# Patient Record
Sex: FEMALE | Race: OTHER | Employment: OTHER | URBAN - METROPOLITAN AREA
[De-identification: names, ages, dates, MRNs, and addresses within clinical notes are randomized per-mention and may not be internally consistent; named-entity substitution may affect disease eponyms.]

---

## 2022-01-17 ENCOUNTER — TELEPHONE (OUTPATIENT)
Dept: INTERNAL MEDICINE CLINIC | Age: 77
End: 2022-01-17

## 2022-01-17 NOTE — TELEPHONE ENCOUNTER
[facility] is calling in to state the patient's  is a  [facility] patient and would like to know if the doctor would accept his wife as a new patient.

## 2022-02-02 ENCOUNTER — OFFICE VISIT (OUTPATIENT)
Dept: INTERNAL MEDICINE CLINIC | Age: 77
End: 2022-02-02
Payer: MEDICARE

## 2022-02-02 VITALS
WEIGHT: 169.2 LBS | DIASTOLIC BLOOD PRESSURE: 79 MMHG | RESPIRATION RATE: 14 BRPM | BODY MASS INDEX: 31.95 KG/M2 | HEART RATE: 84 BPM | TEMPERATURE: 98.1 F | OXYGEN SATURATION: 97 % | SYSTOLIC BLOOD PRESSURE: 124 MMHG | HEIGHT: 61 IN

## 2022-02-02 DIAGNOSIS — E03.9 ACQUIRED HYPOTHYROIDISM: ICD-10-CM

## 2022-02-02 DIAGNOSIS — M41.9 SCOLIOSIS OF LUMBAR SPINE, UNSPECIFIED SCOLIOSIS TYPE: ICD-10-CM

## 2022-02-02 DIAGNOSIS — M43.17 SPONDYLOLISTHESIS OF LUMBOSACRAL REGION: ICD-10-CM

## 2022-02-02 DIAGNOSIS — M54.16 LUMBAR RADICULOPATHY, RIGHT: Primary | ICD-10-CM

## 2022-02-02 DIAGNOSIS — I10 BENIGN ESSENTIAL HTN: ICD-10-CM

## 2022-02-02 PROBLEM — E55.9 VITAMIN D DEFICIENCY: Status: ACTIVE | Noted: 2022-02-02

## 2022-02-02 PROBLEM — F11.99 OPIOID USE, UNSPECIFIED WITH UNSPECIFIED OPIOID-INDUCED DISORDER (HCC): Status: ACTIVE | Noted: 2022-02-02

## 2022-02-02 PROCEDURE — G8754 DIAS BP LESS 90: HCPCS | Performed by: INTERNAL MEDICINE

## 2022-02-02 PROCEDURE — G8510 SCR DEP NEG, NO PLAN REQD: HCPCS | Performed by: INTERNAL MEDICINE

## 2022-02-02 PROCEDURE — 3017F COLORECTAL CA SCREEN DOC REV: CPT | Performed by: INTERNAL MEDICINE

## 2022-02-02 PROCEDURE — G8536 NO DOC ELDER MAL SCRN: HCPCS | Performed by: INTERNAL MEDICINE

## 2022-02-02 PROCEDURE — G8400 PT W/DXA NO RESULTS DOC: HCPCS | Performed by: INTERNAL MEDICINE

## 2022-02-02 PROCEDURE — G8427 DOCREV CUR MEDS BY ELIG CLIN: HCPCS | Performed by: INTERNAL MEDICINE

## 2022-02-02 PROCEDURE — G8752 SYS BP LESS 140: HCPCS | Performed by: INTERNAL MEDICINE

## 2022-02-02 PROCEDURE — 99204 OFFICE O/P NEW MOD 45 MIN: CPT | Performed by: INTERNAL MEDICINE

## 2022-02-02 PROCEDURE — 1101F PT FALLS ASSESS-DOCD LE1/YR: CPT | Performed by: INTERNAL MEDICINE

## 2022-02-02 PROCEDURE — G8417 CALC BMI ABV UP PARAM F/U: HCPCS | Performed by: INTERNAL MEDICINE

## 2022-02-02 PROCEDURE — 1090F PRES/ABSN URINE INCON ASSESS: CPT | Performed by: INTERNAL MEDICINE

## 2022-02-02 RX ORDER — TRAMADOL HYDROCHLORIDE 50 MG/1
50 TABLET ORAL
COMMUNITY
Start: 2022-01-14 | End: 2022-02-02

## 2022-02-02 RX ORDER — LIOTHYRONINE SODIUM 5 UG/1
5 TABLET ORAL DAILY
COMMUNITY
Start: 2021-12-17

## 2022-02-02 RX ORDER — METHYLPREDNISOLONE 4 MG/1
TABLET ORAL
Qty: 1 DOSE PACK | Refills: 0 | Status: SHIPPED | OUTPATIENT
Start: 2022-02-02 | End: 2022-02-12

## 2022-02-02 RX ORDER — TRAMADOL HYDROCHLORIDE 50 MG/1
100 TABLET ORAL
Qty: 180 TABLET | Refills: 0 | Status: SHIPPED | OUTPATIENT
Start: 2022-02-02 | End: 2022-03-11

## 2022-02-02 RX ORDER — LEVOTHYROXINE SODIUM 75 UG/1
75 TABLET ORAL DAILY
COMMUNITY
Start: 2021-12-08 | End: 2022-09-30 | Stop reason: SDUPTHER

## 2022-02-02 RX ORDER — LISINOPRIL 5 MG/1
5 TABLET ORAL DAILY
COMMUNITY
Start: 2021-11-08 | End: 2022-02-28 | Stop reason: ALTCHOICE

## 2022-02-02 RX ORDER — GABAPENTIN 300 MG/1
300 CAPSULE ORAL 3 TIMES DAILY
COMMUNITY
Start: 2021-12-13 | End: 2022-03-07 | Stop reason: SDUPTHER

## 2022-02-02 NOTE — PROGRESS NOTES
HISTORY OF PRESENT ILLNESS    New patient to my practice, referred to me by her , Chelsea Asencio (my pt) and Dr. Chris Graves. Prior medical care has been from Dr. Kenan Calero. she works as a .  her  past medical history was reviewed, discussed, and summarized in the list below. Her primary medical frustration has been chronic lower back pain. She has a history of severe lumbar scoliosis. She consulted neurosurgery Dr. Addison Hendrix in the past and was referred to [facility] neurosurgery. She was recommended to have a extensive surgeries involving rods in her spine and she did not really want to do that at the time. She was referred to physical therapy as well. She has been prescribed gabapentin and milligrams 3 times daily by her primary care doctor Dr. Kenan Calero as well as tramadol 50 mg 4 times daily with his chronic pain. Unfortunately, she has recently had an exacerbation of this pain with a new onset radicular pain down her right leg. Onset was 1 month ago during a yoga class. Travels from her lower back to her buttock to hip to toes. She has had difficulty walking and some difficulty doing much of anything because of this pain. She says that taking an extra tramadol does help some. She has made an appointment with orthopedics Dr. Mirain Roach but she wonders if she should also see a neurosurgeon. Last filled gabapentin #180 12/13/21  Last filled tramadol 50 mg #120 1/14/22    Hypertension  Hypertension ROS: taking medications as instructed, no medication side effects noted, no TIA's, no chest pain on exertion, no dyspnea on exertion, no swelling of ankles     reports that she has never smoked. She has never used smokeless tobacco.    reports no history of alcohol use. BP Readings from Last 2 Encounters:   02/02/22 124/79     Hypothyroidism. She is followed by endocrinology Dr. Gaby Irizarry.   Sounds like patient was previously taking [place] Thyroid or something similar but was recently transitioned to levothyroxine and liothyronine. She is currently taking levothyroxine 75 mcg daily and liothyronine 5 mcg daily. Labs done January 5, 2022 listed below. TSH was low. Liothyronine was decreased. I think. Denies any palpitations. She did have some mild tremor. -HEMOGLOBIN A1C   2022-01-05     GLYCOHEMOGLOBIN (A1C)% 5.7   0.0 - 6.9   -CBC   2022-01-05     WBC 5.9   4.5 - 11.0   RBC 5.00   4.00 - 5.20   HGB 14.3   12.0 - 16.0   HCT 44   33 - 51   MCV 89   80 - 100   MCH 29   26 - 34   MCHC 32   32 - 36      150 - 450   RDW-CV 13   11 - 15   -CMP   2022-01-05     GLUCOSE 124   60 - 100   BUN 14   7 - 25   CREATININE 0.67   0.56 - 1.34   eGFR AA (calc) 110   >60   eGFR Non-AA (calc) 91   >60   SODIUM 140   136 - 145   POTASSIUM 4.7   3.5 - 5.1   CHLORIDE 105   98 - 107   CO2 31   21 - 31   CALCIUM 9.6   8.6 - 10.3   TOTAL PROTEIN 7.0   6.0 - 7.8   ALBUMIN 4.4   3.5 - 5.7   GLOBULIN (CALC) 2.7   1.5 - 4.5   A/G RATIO 1.6   1.1 - 3.1   BILIRUBIN,TOTAL 0.5   0.3 - 1.3   ALK PHOS 57   34 - 104   AST(SGOT) 15   11 - 45   ALT(SGPT) 16   7 - 52   -FTI TSH T3   2022-01-05     T4, Total 11.44   5.50 - 12.50   T-UPTAKE 45   32 - 48   FTI (FREE THYROXINE INDEX) 13   6 - 13   T3 106   71 - 180   TSH < 0.1   0.50 - 5.00   -VIT B12   2022-01-05     VIT B12 845   180 - 914   -VIT D, 25-OH   2022-01-05     VIT D, 25-OH 52.88   30.00 - 100.00   -TPO   2022-01-05     TPO 0.3   0.1 - 36.8     Review of Systems   All other systems reviewed and are negative, except as noted in HPI      Past Medical and Surgical History  Past Medical History:   Diagnosis Date    Benign essential HTN     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 Alex Mojica, Dr. Nick Marking Hiatal hernia 2015    s/p repair Dr. Malorie Bonilla Hyponatremia     126 12/16/21. improved 1/5/22.  Hypothyroidism     Dr. Leonard Gonzalez Lumbar radiculopathy, right 12/2021    Dr. Colin Albrecht.   onset during yoga class 12/2021    Multiple thyroid nodules 2015    Small bilateral thyroid nodules  Scoliosis 2014    Dr. Kyle Moses at [employer] [facility]. 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 She takes supplements not listed  Current Outpatient Medications   Medication Sig    lisinopriL (PRINIVIL, ZESTRIL) 5 mg tablet Take 5 mg by mouth daily.  gabapentin (NEURONTIN) 300 mg capsule Take 300 mg by mouth three (3) times daily.  levothyroxine (SYNTHROID) 75 mcg tablet Take 75 mcg by mouth daily.  liothyronine (CYTOMEL) 5 mcg tablet Take 5 mcg by mouth daily.  methylPREDNISolone (MEDROL DOSEPACK) 4 mg tablet USE AS DIRECTED ON PACKAGE    traMADoL (ULTRAM) 50 mg tablet Take 2 Tablets by mouth every eight (8) hours as needed for Pain for up to 30 days. Max Daily Amount: 300 mg. Increased 2/2/22     No current facility-administered medications for this visit. reports that she has never smoked. She has never used smokeless tobacco. She reports that she does not drink alcohol and does not use drugs. family history includes Heart Attack in her father; Hypertension in her father and mother; Thyroid Disease in her father. Physical Exam   Nursing note and vitals reviewed. Blood pressure 124/79, pulse 84, temperature 98.1 °F (36.7 °C), temperature source Oral, resp. rate 14, height 5' 1\" (1.549 m), weight 169 lb 3.2 oz (76.7 kg), SpO2 97 %. Constitutional: oriented to person, place, and time. No distress. Eyes: Conjunctivae are normal.   HEENT:  No cervical lymphadenopathy. No thyroid nodules or goiter  Cardiovascular: Normal rate. Regular rhythm, no murmurs, rubs.    No edema  Pulmonary/Chest: Effort normal. clear to auscultation  Abdominal: soft, non-tender, non-distended  Musculoskeletal: Neurological: Alert and oriented to person, place. Cranial nerves grossly intact. Normal gait   Skin: No visible rash noted. Psychiatric: Normal mood and affect. Behavior is normal.     ASSESSMENT and PLAN  1. Lumbar radiculopathy, right  She has chronic significant scoliosis and likely foraminal stenosis. Acute pain which has been persisting for over 1 month and is relatively debilitating. Has never had an MRI of her lumbar spine I think interventions will be needed. This may be a surgical issue and she will consult with Dr. Madhuri Bright and she may need to consult with a scoliosis specialist or neurosurgeon. She has been told that injections are not possible because of her degree of curvature. Could potentially benefit from physical therapy. Could potentially benefit from a brace. Trial of Medrol. Increase tramadol.  reviewed. Continue gabapentin.  -     MRI LUMB SPINE WO CONT; Future  -     methylPREDNISolone (MEDROL DOSEPACK) 4 mg tablet; USE AS DIRECTED ON PACKAGE, Normal, Disp-1 Dose Pack, R-0  -     traMADoL (ULTRAM) 50 mg tablet; Take 2 Tablets by mouth every eight (8) hours as needed for Pain for up to 30 days. Max Daily Amount: 300 mg. Increased 2/2/22, Normal, Disp-180 Tablet, R-0  2. Spondylolisthesis of lumbosacral region  3. Scoliosis of lumbar spine, unspecified scoliosis type  -     MRI LUMB SPINE WO CONT; Future  -     methylPREDNISolone (MEDROL DOSEPACK) 4 mg tablet; USE AS DIRECTED ON PACKAGE, Normal, Disp-1 Dose Pack, R-0  -     traMADoL (ULTRAM) 50 mg tablet; Take 2 Tablets by mouth every eight (8) hours as needed for Pain for up to 30 days. Max Daily Amount: 300 mg. Increased 2/2/22, Normal, Disp-180 Tablet, R-0    4. Acquired hypothyroidism  Managed by endocrinology Dr. Julia Jansen. On levothyroxine and liothyronine. Currently largely asymptomatic    5.  Benign essential HTN  This condition is controlled on current medication regimen as written in medication list.  Medications refilled. She will send me records from Dr. Pillo Mcnally office.   Return to clinic for wellness exam      lab results and schedule of future lab studies reviewed with patient  reviewed medications and side effects in detail

## 2022-02-10 ENCOUNTER — HOSPITAL ENCOUNTER (OUTPATIENT)
Dept: MRI IMAGING | Age: 77
Discharge: HOME | End: 2022-02-10
Attending: INTERNAL MEDICINE
Payer: MEDICARE

## 2022-02-10 DIAGNOSIS — M54.16 LUMBAR RADICULOPATHY, RIGHT: ICD-10-CM

## 2022-02-10 DIAGNOSIS — M41.9 SCOLIOSIS OF LUMBAR SPINE, UNSPECIFIED SCOLIOSIS TYPE: ICD-10-CM

## 2022-02-10 DIAGNOSIS — M43.17 SPONDYLOLISTHESIS OF LUMBOSACRAL REGION: ICD-10-CM

## 2022-02-10 PROCEDURE — 72148 MRI LUMBAR SPINE W/O DYE: CPT

## 2022-02-28 RX ORDER — AMLODIPINE BESYLATE 5 MG/1
5 TABLET ORAL DAILY
Qty: 90 TABLET | Refills: 1 | Status: SHIPPED | OUTPATIENT
Start: 2022-02-28 | End: 2022-08-16

## 2022-03-07 ENCOUNTER — PATIENT MESSAGE (OUTPATIENT)
Dept: INTERNAL MEDICINE CLINIC | Age: 77
End: 2022-03-07

## 2022-03-07 DIAGNOSIS — M54.16 LUMBAR RADICULOPATHY, RIGHT: Primary | ICD-10-CM

## 2022-03-07 RX ORDER — GABAPENTIN 300 MG/1
300 CAPSULE ORAL 3 TIMES DAILY
Qty: 90 CAPSULE | Refills: 0 | Status: SHIPPED | OUTPATIENT
Start: 2022-03-07 | End: 2022-04-26

## 2022-03-11 DIAGNOSIS — M54.16 LUMBAR RADICULOPATHY, RIGHT: ICD-10-CM

## 2022-03-11 DIAGNOSIS — M41.9 SCOLIOSIS OF LUMBAR SPINE, UNSPECIFIED SCOLIOSIS TYPE: ICD-10-CM

## 2022-03-11 RX ORDER — TRAMADOL HYDROCHLORIDE 50 MG/1
TABLET ORAL
Qty: 180 TABLET | Refills: 0 | Status: SHIPPED | OUTPATIENT
Start: 2022-03-11 | End: 2022-04-26

## 2022-03-18 PROBLEM — E55.9 VITAMIN D DEFICIENCY: Status: ACTIVE | Noted: 2022-02-02

## 2022-03-19 PROBLEM — E03.9 HYPOTHYROIDISM: Status: ACTIVE | Noted: 2022-02-02

## 2022-03-20 PROBLEM — M54.16 LUMBAR RADICULOPATHY, RIGHT: Status: ACTIVE | Noted: 2021-12-01

## 2022-06-02 ENCOUNTER — TELEPHONE (OUTPATIENT)
Dept: INTERNAL MEDICINE CLINIC | Age: 77
End: 2022-06-02

## 2022-06-03 NOTE — TELEPHONE ENCOUNTER
She calls with luq abdominal pain that started today and an episode of vomiting when leaning over and describes urine drainage that is dark and thick like loose stool-no fevers no flank pain. The pain has increased over the last few hours.  I advised her to go to the er for evaluation to be sure no signs of pyelonephritis given symptoms-she agrees and plans to go to [facility] er

## 2022-06-09 ENCOUNTER — TELEPHONE (OUTPATIENT)
Dept: INTERNAL MEDICINE CLINIC | Age: 77
End: 2022-06-09

## 2022-06-09 NOTE — TELEPHONE ENCOUNTER
Spoke with patient and relayed Dr. Bhavana Porras comments and recommendations. She states understanding and scheduled a Hospital F/U 6/21/22 @ 11:20 AM.  Maxim Amos for the call.

## 2022-06-09 NOTE — TELEPHONE ENCOUNTER
I have communicated with her urologist, Dr. Vitaly Batista. She has kidney stones and recent bilateral stents. Bleeding is expected now and may last weeks. No intervention is needed unless she is passing significant clots or having issues with starting urination due to clots. Follow-up with urology. Drink plenty of water.

## 2022-06-09 NOTE — TELEPHONE ENCOUNTER
RTC to patient and she reports that last week she yosi tot  ER for Bilateral Kidney Pain. Patient found to have bilateral kidney stones and was admitted 22 and on 22 Dr. Laneta Apgar placed stents. She reports that she has had bright red urine since she left the hospital and was ambulatory. Advised that she should f/u with Dr. Laneta Apgar and she reports that his office told her to go to ER or f/u  With PCP. Advised that she should go to ER for evaluation and she is asking if she could just have and acute care visit with Dr. Deedee Álvarez. Dr. Deedee Álvarez notified.

## 2022-06-10 DIAGNOSIS — M41.9 SCOLIOSIS OF LUMBAR SPINE, UNSPECIFIED SCOLIOSIS TYPE: ICD-10-CM

## 2022-06-10 DIAGNOSIS — M54.16 LUMBAR RADICULOPATHY, RIGHT: ICD-10-CM

## 2022-06-10 RX ORDER — TRAMADOL HYDROCHLORIDE 50 MG/1
TABLET ORAL
Qty: 180 TABLET | Refills: 0 | Status: SHIPPED | OUTPATIENT
Start: 2022-06-10 | End: 2022-07-17

## 2022-06-21 ENCOUNTER — OFFICE VISIT (OUTPATIENT)
Dept: INTERNAL MEDICINE CLINIC | Age: 77
End: 2022-06-21
Payer: MEDICARE

## 2022-06-21 VITALS
HEART RATE: 89 BPM | OXYGEN SATURATION: 96 % | DIASTOLIC BLOOD PRESSURE: 76 MMHG | HEIGHT: 61 IN | WEIGHT: 169.8 LBS | TEMPERATURE: 98.6 F | BODY MASS INDEX: 32.06 KG/M2 | SYSTOLIC BLOOD PRESSURE: 125 MMHG | RESPIRATION RATE: 15 BRPM

## 2022-06-21 DIAGNOSIS — N20.0 NEPHROLITHIASIS: Primary | ICD-10-CM

## 2022-06-21 DIAGNOSIS — R94.31 EKG ABNORMALITIES: ICD-10-CM

## 2022-06-21 DIAGNOSIS — R31.0 GROSS HEMATURIA: ICD-10-CM

## 2022-06-21 DIAGNOSIS — I10 BENIGN ESSENTIAL HTN: ICD-10-CM

## 2022-06-21 DIAGNOSIS — R74.8 SERUM LIPASE ELEVATION: ICD-10-CM

## 2022-06-21 DIAGNOSIS — K86.9 PANCREATIC LESION: ICD-10-CM

## 2022-06-21 DIAGNOSIS — D43.2 HEMANGIOBLASTOMA OF BRAIN (HCC): ICD-10-CM

## 2022-06-21 PROCEDURE — G8427 DOCREV CUR MEDS BY ELIG CLIN: HCPCS | Performed by: INTERNAL MEDICINE

## 2022-06-21 PROCEDURE — G8400 PT W/DXA NO RESULTS DOC: HCPCS | Performed by: INTERNAL MEDICINE

## 2022-06-21 PROCEDURE — 1101F PT FALLS ASSESS-DOCD LE1/YR: CPT | Performed by: INTERNAL MEDICINE

## 2022-06-21 PROCEDURE — G8752 SYS BP LESS 140: HCPCS | Performed by: INTERNAL MEDICINE

## 2022-06-21 PROCEDURE — G8536 NO DOC ELDER MAL SCRN: HCPCS | Performed by: INTERNAL MEDICINE

## 2022-06-21 PROCEDURE — 99214 OFFICE O/P EST MOD 30 MIN: CPT | Performed by: INTERNAL MEDICINE

## 2022-06-21 PROCEDURE — 3017F COLORECTAL CA SCREEN DOC REV: CPT | Performed by: INTERNAL MEDICINE

## 2022-06-21 PROCEDURE — G8417 CALC BMI ABV UP PARAM F/U: HCPCS | Performed by: INTERNAL MEDICINE

## 2022-06-21 PROCEDURE — 1090F PRES/ABSN URINE INCON ASSESS: CPT | Performed by: INTERNAL MEDICINE

## 2022-06-21 PROCEDURE — G8510 SCR DEP NEG, NO PLAN REQD: HCPCS | Performed by: INTERNAL MEDICINE

## 2022-06-21 PROCEDURE — G8754 DIAS BP LESS 90: HCPCS | Performed by: INTERNAL MEDICINE

## 2022-06-21 RX ORDER — ACETYLCYSTEINE 600 MG
CAPSULE ORAL DAILY
COMMUNITY

## 2022-06-21 RX ORDER — MV-MN/C/THEANINE/HERB NO.310 1000-200MG
POWDER IN PACKET (EA) ORAL DAILY
COMMUNITY

## 2022-06-21 RX ORDER — MELATONIN 5 MG
CAPSULE ORAL
COMMUNITY

## 2022-06-21 RX ORDER — CHOLECALCIFEROL (VITAMIN D3) 125 MCG
100 CAPSULE ORAL DAILY
COMMUNITY
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
## 2022-07-09 ENCOUNTER — APPOINTMENT (OUTPATIENT)
Dept: CT IMAGING | Age: 77
End: 2022-07-09
Attending: STUDENT IN AN ORGANIZED HEALTH CARE EDUCATION/TRAINING PROGRAM
Payer: MEDICARE

## 2022-07-09 ENCOUNTER — HOSPITAL ENCOUNTER (EMERGENCY)
Age: 77
Discharge: HOME | End: 2022-07-09
Attending: STUDENT IN AN ORGANIZED HEALTH CARE EDUCATION/TRAINING PROGRAM
Payer: MEDICARE

## 2022-07-09 VITALS
OXYGEN SATURATION: 97 % | SYSTOLIC BLOOD PRESSURE: 131 MMHG | DIASTOLIC BLOOD PRESSURE: 56 MMHG | HEIGHT: 61 IN | HEART RATE: 66 BPM | RESPIRATION RATE: 16 BRPM | WEIGHT: 165 LBS | BODY MASS INDEX: 31.15 KG/M2 | TEMPERATURE: 99.8 F

## 2022-07-09 DIAGNOSIS — N12 PYELONEPHRITIS: Primary | ICD-10-CM

## 2022-07-09 DIAGNOSIS — N13.30 HYDRONEPHROSIS, UNSPECIFIED HYDRONEPHROSIS TYPE: ICD-10-CM

## 2022-07-09 LAB
ALBUMIN SERPL-MCNC: 3.1 G/DL (ref 3.5–5)
ALBUMIN/GLOB SERPL: 0.7 {RATIO} (ref 1.1–2.2)
ALP SERPL-CCNC: 74 U/L (ref 45–117)
ALT SERPL-CCNC: 21 U/L (ref 12–78)
ANION GAP SERPL CALC-SCNC: 5 MMOL/L (ref 5–15)
APPEARANCE UR: ABNORMAL
AST SERPL-CCNC: 16 U/L (ref 15–37)
BACTERIA URNS QL MICRO: ABNORMAL /HPF
BASOPHILS # BLD: 0 K/UL (ref 0–0.1)
BASOPHILS NFR BLD: 0 % (ref 0–1)
BILIRUB SERPL-MCNC: 0.4 MG/DL (ref 0.2–1)
BILIRUB UR QL: NEGATIVE
BUN SERPL-MCNC: 16 MG/DL (ref 6–20)
BUN/CREAT SERPL: 24 (ref 12–20)
CALCIUM SERPL-MCNC: 8.9 MG/DL (ref 8.5–10.1)
CHLORIDE SERPL-SCNC: 105 MMOL/L (ref 97–108)
CO2 SERPL-SCNC: 27 MMOL/L (ref 21–32)
COLOR UR: ABNORMAL
COMMENT, HOLDF: NORMAL
CREAT SERPL-MCNC: 0.66 MG/DL (ref 0.55–1.02)
DIFFERENTIAL METHOD BLD: ABNORMAL
EOSINOPHIL # BLD: 0 K/UL (ref 0–0.4)
EOSINOPHIL NFR BLD: 0 % (ref 0–7)
EPITH CASTS URNS QL MICRO: ABNORMAL /LPF
ERYTHROCYTE [DISTWIDTH] IN BLOOD BY AUTOMATED COUNT: 13.6 % (ref 11.5–14.5)
GLOBULIN SER CALC-MCNC: 4.2 G/DL (ref 2–4)
GLUCOSE SERPL-MCNC: 129 MG/DL (ref 65–100)
GLUCOSE UR STRIP.AUTO-MCNC: NEGATIVE MG/DL
HCT VFR BLD AUTO: 37.2 % (ref 35–47)
HGB BLD-MCNC: 12.2 G/DL (ref 11.5–16)
HGB UR QL STRIP: ABNORMAL
IMM GRANULOCYTES # BLD AUTO: 0 K/UL
IMM GRANULOCYTES NFR BLD AUTO: 0 %
KETONES UR QL STRIP.AUTO: ABNORMAL MG/DL
LACTATE BLD-SCNC: 0.42 MMOL/L (ref 0.4–2)
LEUKOCYTE ESTERASE UR QL STRIP.AUTO: ABNORMAL
LYMPHOCYTES # BLD: 0.6 K/UL (ref 0.8–3.5)
LYMPHOCYTES NFR BLD: 7 % (ref 12–49)
MCH RBC QN AUTO: 27.7 PG (ref 26–34)
MCHC RBC AUTO-ENTMCNC: 32.8 G/DL (ref 30–36.5)
MCV RBC AUTO: 84.5 FL (ref 80–99)
MONOCYTES # BLD: 0.4 K/UL (ref 0–1)
MONOCYTES NFR BLD: 5 % (ref 5–13)
NEUTS BAND NFR BLD MANUAL: 21 % (ref 0–6)
NEUTS SEG # BLD: 7.5 K/UL (ref 1.8–8)
NEUTS SEG NFR BLD: 67 % (ref 32–75)
NITRITE UR QL STRIP.AUTO: POSITIVE
NRBC # BLD: 0 K/UL (ref 0–0.01)
NRBC BLD-RTO: 0 PER 100 WBC
PH UR STRIP: 5.5 [PH] (ref 5–8)
PLATELET # BLD AUTO: 230 K/UL (ref 150–400)
PMV BLD AUTO: 9.6 FL (ref 8.9–12.9)
POTASSIUM SERPL-SCNC: 4.1 MMOL/L (ref 3.5–5.1)
PROT SERPL-MCNC: 7.3 G/DL (ref 6.4–8.2)
PROT UR STRIP-MCNC: 100 MG/DL
RBC # BLD AUTO: 4.4 M/UL (ref 3.8–5.2)
RBC #/AREA URNS HPF: ABNORMAL /HPF (ref 0–5)
RBC MORPH BLD: ABNORMAL
SAMPLES BEING HELD,HOLD: NORMAL
SODIUM SERPL-SCNC: 137 MMOL/L (ref 136–145)
SP GR UR REFRACTOMETRY: 1.01 (ref 1–1.03)
UROBILINOGEN UR QL STRIP.AUTO: 1 EU/DL (ref 0.2–1)
WBC # BLD AUTO: 8.5 K/UL (ref 3.6–11)
WBC URNS QL MICRO: ABNORMAL /HPF (ref 0–4)

## 2022-07-09 PROCEDURE — 36415 COLL VENOUS BLD VENIPUNCTURE: CPT

## 2022-07-09 PROCEDURE — 74176 CT ABD & PELVIS W/O CONTRAST: CPT

## 2022-07-09 PROCEDURE — 99284 EMERGENCY DEPT VISIT MOD MDM: CPT

## 2022-07-09 PROCEDURE — 87186 SC STD MICRODIL/AGAR DIL: CPT

## 2022-07-09 PROCEDURE — 87086 URINE CULTURE/COLONY COUNT: CPT

## 2022-07-09 PROCEDURE — 81001 URINALYSIS AUTO W/SCOPE: CPT

## 2022-07-09 PROCEDURE — 87077 CULTURE AEROBIC IDENTIFY: CPT

## 2022-07-09 PROCEDURE — 74011250636 HC RX REV CODE- 250/636: Performed by: STUDENT IN AN ORGANIZED HEALTH CARE EDUCATION/TRAINING PROGRAM

## 2022-07-09 PROCEDURE — 83605 ASSAY OF LACTIC ACID: CPT

## 2022-07-09 PROCEDURE — 96360 HYDRATION IV INFUSION INIT: CPT

## 2022-07-09 PROCEDURE — 80053 COMPREHEN METABOLIC PANEL: CPT

## 2022-07-09 PROCEDURE — 85025 COMPLETE CBC W/AUTO DIFF WBC: CPT

## 2022-07-09 RX ORDER — CEFDINIR 300 MG/1
300 CAPSULE ORAL 2 TIMES DAILY
Qty: 20 CAPSULE | Refills: 0 | Status: SHIPPED | OUTPATIENT
Start: 2022-07-09 | End: 2022-07-19

## 2022-07-09 RX ADMIN — SODIUM CHLORIDE 1000 ML: 9 INJECTION, SOLUTION INTRAVENOUS at 12:04

## 2022-07-09 NOTE — ED TRIAGE NOTES
Patient arrives ambulatory to ED with complaints of fever and right flank pain     Also reports nausea     History of kidney stones with stent placement, was seen by provider today, given rocephin injection    Tylenol taken at 0700

## 2022-07-09 NOTE — ED PROVIDER NOTES
Date of Service:  7/9/2022    Patient:  Althea Rico    Chief Complaint:  Flank Pain       HPI:  Althea Rico is a 78 y.o.  female with a past medical history of HTN and kidney stones who presents for evaluation of flank pain and fever. Patient was admitted 6/4-6/6 for bilateral nephrolithiasis requiring stent placement bilaterally, with stent extraction on 6/28. Patient reports several days ago right flank pain returned. She has since been having fevers for the last 2 nights. T-max this morning 101 Fahrenheit, took Tylenol at 7 AM.  Patient was seen in outpatient urology office just prior to arrival, sent to the emergency department for further work-up with concern for infected stone. Patient was given a dose of ceftriaxone in office prior to arrival.  She endorses associated nausea but no vomiting. No dysuria, has had hematuria for the last 1 month and this is not new for her. Well no other recent illnesses. 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 s/p surgery Dr. Natasha Galindo, Dr. Baum Heal hernia 2015    s/p repair Dr. Letitia Wright Hyponatremia     126 12/16/21. improved 1/5/22.  Hypothyroidism     Dr. Boby Potts Lumbar radiculopathy, right 12/2021    Dr. Jessica Walsh. onset during yoga class 12/2021    Multiple thyroid nodules 2015    Small bilateral thyroid nodules    Nephrolithiasis 06/06/2022    Dr. Ene Louise. stents placed    Scoliosis 2014    Dr. Catrachito Lam at [facility]. recommended to have surgery     Spondylolisthesis 9/19/2014    Tarlov cyst 4/1/2014    Vitamin D deficiency 2/2/2022       Past Surgical History:   Procedure Laterality Date    HX ABDOMINAL LAPAROSCOPY  2015    lap nissen repair of hiatal hernia. Dr. Mickey QUIROZ  2014    Hemangioblastoma brain surgery. Dr. Lionel Ugalde, Dr. Elise Vazquez 2011    Dr. Rian Miramontes  2002    [facility]  2014    Thyroid nodule exam    HX OTHER SURGICAL  06/10/2022    stent - kidney         Family History:   Problem Relation Age of Onset    Hypertension Mother     Hypertension Father     Heart Attack Father     Thyroid Disease Father         thyroid removed       Social History     Socioeconomic History    Marital status:      Spouse name: Prakash Rm Number of children: 3    Years of education: Not on file    Highest education level: Not on file   Occupational History    Not on file   Tobacco Use    Smoking status: Never Smoker    Smokeless tobacco: Never Used   Substance and Sexual Activity    Alcohol use: Never    Drug use: Never    Sexual activity: Yes     Partners: Male   Other Topics Concern    Not on file   Social History Narrative    Not on file     Social Determinants of Health     Financial Resource Strain:     Difficulty of Paying Living Expenses: Not on file   Food Insecurity:     Worried About Running Out of Food in the Last Year: Not on file    Pam of Food in the Last Year: Not on file   Transportation Needs:     Lack of Transportation (Medical): Not on file    Lack of Transportation (Non-Medical):  Not on file   Physical Activity:     Days of Exercise per Week: Not on file    Minutes of Exercise per Session: Not on file   Stress:     Feeling of Stress : Not on file   Social Connections:     Frequency of Communication with Friends and Family: Not on file    Frequency of Social Gatherings with Friends and Family: Not on file    Attends Jehovah's witness Services: Not on file    Active Member of Clubs or Organizations: Not on file    Attends Club or Organization Meetings: Not on file    Marital Status: Not on file   Intimate Partner Violence:     Fear of Current or Ex-Partner: Not on file   [employer] Abused: Not on file    Physically Abused: Not on file    Sexually Abused: Not on file   Housing Stability:     Unable to Pay for Housing in the Last Year: Not on file    Number of Places Lived in the Last Year: Not on file    Unstable Housing in the Last Year: Not on file         ALLERGIES: Patient has no known allergies. Review of Systems   Constitutional: Positive for fever. Negative for chills. HENT: Negative for congestion and rhinorrhea. Eyes: Negative for discharge and redness. Respiratory: Negative for cough and shortness of breath. Cardiovascular: Negative for chest pain and leg swelling. Gastrointestinal: Positive for nausea. Negative for abdominal pain, diarrhea and vomiting. Genitourinary: Positive for flank pain and hematuria. Negative for dysuria. Musculoskeletal: Negative for back pain and myalgias. Skin: Negative for color change and rash. Neurological: Negative for speech difficulty and headaches. Psychiatric/Behavioral: Negative for agitation and confusion. Vitals:    07/09/22 1123   BP: 135/87   Pulse: (!) 104   Resp: 17   Temp: 99.4 °F (37.4 °C)   SpO2: 94%   Weight: 74.8 kg (165 lb)   Height: 5' 1\" (1.549 m)            Physical Exam  Vitals and nursing note reviewed. Constitutional:       Appearance: Normal appearance. HENT:      Head: Normocephalic and atraumatic. Mouth/Throat:      Mouth: Mucous membranes are moist.      Pharynx: Oropharynx is clear. Eyes:      Extraocular Movements: Extraocular movements intact. Conjunctiva/sclera: Conjunctivae normal.   Cardiovascular:      Rate and Rhythm: Regular rhythm. Tachycardia present. Pulses: Normal pulses. Pulmonary:      Effort: Pulmonary effort is normal. No respiratory distress. Breath sounds: Normal breath sounds. Abdominal:      General: Abdomen is flat. Palpations: Abdomen is soft. Tenderness: There is no abdominal tenderness.  There is no right CVA tenderness or left CVA tenderness. Musculoskeletal:         General: Normal range of motion. Right lower leg: No edema. Left lower leg: No edema. Skin:     General: Skin is warm and dry. Capillary Refill: Capillary refill takes less than 2 seconds. Neurological:      General: No focal deficit present. Mental Status: She is alert and oriented to person, place, and time. Psychiatric:         Mood and Affect: Mood normal.         Behavior: Behavior normal.          MDM  Number of Diagnoses or Management Options  Hydronephrosis, unspecified hydronephrosis type  Pyelonephritis  Diagnosis management comments: DECISION MAKING:  Clayton Kennedy is a 78 y.o. female who comes in as above. On arrival to the emergency department, vital signs notable for mild tachycardia working limited to, the right stable. ThereShe is afebrile. Hospital does not meet SIRS criteria. Differential diagnosis includes, but not limited to, ureterolithiasis, infected ureterolithiasis, pyelonephritis, cystitis. She will be evaluated with labs, UA and CT imaging of the abdomen and pelvis to assess for stones. 1 L IV fluids for hydration. Labs notable for no leukocytosis but bandemia at 21%. Electrolytes and kidney function normal. CT shows non-obstructing b/l renal calculi with right hydronephrosis and hydroureter but no definitive stone. Imaging was reviewed by on-call urologist and believes this is likely residual from recent stent. UA does show infection and will be sent for culture. Given she was recently treated with a course of Keflex, will start course of cefdinir. Patient received ceftriaxone today during her outpatient urology appointment. Results and incidental CT findings were discussed with patient. She was instructed to start the cefdinir tonight.   She was encouraged to return immediately to the emergency department if her fevers are not improving despite antibiotics, if she develops worsening pain, vomiting and cannot keep her antibiotic down or any other concerns. She was instructed to follow-up with her urologist this upcoming week. Patient verbalized understanding and was discharged. IMAGING:  CT ABD PELV WO CONT   Final Result        1. There are bilateral nonobstructing renal calculi. There is moderate right     hydroureteronephrosis. Right ureter is dilated into the right pelvis and the     distal right ureter does not appear dilated. No definite stone is identified     within the ureter. 2 tiny calcifications in the right pelvis appear to be    outside of the ureter or adjacent to the ureter may be phleboliths. . CT IVP may    be helpful for further assessment to exclude ureteral abnormality. 2. There are low densities in the liver which are nonspecific and could be cysts    or hemangiomata. Medications During Visit:  Medications  sodium chloride 0.9 % bolus infusion 1,000 mL (0 mL IntraVENous Stopped 7/9/22 1300)      IMPRESSION:  Pyelonephritis  (primary encounter diagnosis)  Hydronephrosis, unspecified hydronephrosis type    DISPOSITION:  Discharged      Discharge Medication List as of 7/9/2022  1:18 PM    START taking these medications    cefdinir (OMNICEF) 300 mg capsule  Take 1 Capsule by mouth two (2) times a day for 10 days. , Normal, Disp-20 Capsule, R-0        Follow-up Information     Follow up With Specialties Details Why Contact Info    Tin Thorne MD Internal Medicine Physician Schedule an appointment as soon as possible for a visit      Your urologist  Schedule an appointment as soon as possible for a visit               The patient is asked to follow-up with their primary care provider in the next several days. They are to call tomorrow for an appointment. The patient is asked to return promptly for any increased concerns or worsening of symptoms. They can return to this emergency department or any other emergency department.          Amount and/or Complexity of Data Reviewed  Clinical lab tests: reviewed  Tests in the radiology section of CPT®: reviewed           Procedures        Geraldine Lorenz DO

## 2022-07-09 NOTE — DISCHARGE INSTRUCTIONS
Please follow-up with urologist on Monday. Please take your antibiotic as directed. Return to the emergency department if you are having continued fevers despite antibiotics, if you have worsening pain, if you are vomiting and cannot keep fluids down or any other concerns.

## 2022-07-12 LAB
BACTERIA SPEC CULT: ABNORMAL
CC UR VC: ABNORMAL
SERVICE CMNT-IMP: ABNORMAL

## 2022-07-13 RX ORDER — CIPROFLOXACIN 500 MG/1
500 TABLET ORAL 2 TIMES DAILY
Qty: 14 TABLET | Refills: 0 | Status: SHIPPED | OUTPATIENT
Start: 2022-07-13 | End: 2022-07-20

## 2022-07-13 NOTE — PROGRESS NOTES
Pt returned call. Discussed culture result.   Informed to stop omnicef  ERX for cipro 500 mg bid x 7 days to [facility] on [address]

## 2022-08-01 ENCOUNTER — HOSPITAL ENCOUNTER (OUTPATIENT)
Dept: MRI IMAGING | Age: 77
Discharge: HOME | End: 2022-08-01
Attending: INTERNAL MEDICINE
Payer: MEDICARE

## 2022-08-01 VITALS — WEIGHT: 165 LBS | BODY MASS INDEX: 31.18 KG/M2

## 2022-08-01 DIAGNOSIS — R74.8 SERUM LIPASE ELEVATION: ICD-10-CM

## 2022-08-01 DIAGNOSIS — K86.9 PANCREATIC LESION: ICD-10-CM

## 2022-08-01 PROCEDURE — A9576 INJ PROHANCE MULTIPACK: HCPCS | Performed by: RADIOLOGY

## 2022-08-01 PROCEDURE — 74011250636 HC RX REV CODE- 250/636: Performed by: RADIOLOGY

## 2022-08-01 PROCEDURE — 77030021566

## 2022-08-01 PROCEDURE — 74183 MRI ABD W/O CNTR FLWD CNTR: CPT

## 2022-08-01 RX ADMIN — GADOTERIDOL 14 ML: 279.3 INJECTION, SOLUTION INTRAVENOUS at 15:29

## 2022-10-05 DIAGNOSIS — M54.16 LUMBAR RADICULOPATHY, RIGHT: ICD-10-CM

## 2022-10-05 RX ORDER — TRAMADOL HYDROCHLORIDE 50 MG/1
100 TABLET ORAL
Qty: 180 TABLET | Refills: 0 | Status: SHIPPED | OUTPATIENT
Start: 2022-10-05 | End: 2022-11-04 | Stop reason: SDUPTHER

## 2023-01-04 RX ORDER — AMLODIPINE BESYLATE 5 MG/1
TABLET ORAL
Qty: 90 TABLET | Refills: 1 | Status: SHIPPED | OUTPATIENT
Start: 2023-01-04

## 2023-01-09 DIAGNOSIS — M54.16 LUMBAR RADICULOPATHY, RIGHT: ICD-10-CM

## 2023-01-09 RX ORDER — TRAMADOL HYDROCHLORIDE 50 MG/1
100 TABLET ORAL
Qty: 180 TABLET | Refills: 1 | Status: SHIPPED | OUTPATIENT
Start: 2023-01-09 | End: 2023-04-09

## 2023-03-10 DIAGNOSIS — M54.16 LUMBAR RADICULOPATHY, RIGHT: ICD-10-CM

## 2023-03-11 RX ORDER — TRAMADOL HYDROCHLORIDE 50 MG/1
TABLET ORAL
Qty: 180 TABLET | Refills: 0 | Status: SHIPPED | OUTPATIENT
Start: 2023-03-11 | End: 2023-04-10

## 2023-05-19 RX ORDER — AMLODIPINE BESYLATE 5 MG/1
1 TABLET ORAL DAILY
COMMUNITY
Start: 2023-01-04

## 2023-05-19 RX ORDER — LIOTHYRONINE SODIUM 5 UG/1
5 TABLET ORAL DAILY
COMMUNITY
Start: 2021-12-17

## 2023-05-19 RX ORDER — LEVOTHYROXINE SODIUM 0.07 MG/1
75 TABLET ORAL DAILY
COMMUNITY
Start: 2022-09-30

## 2023-05-19 RX ORDER — UBIDECARENONE 100 MG
100 CAPSULE ORAL DAILY
COMMUNITY

## 2023-05-19 RX ORDER — GABAPENTIN 300 MG/1
CAPSULE ORAL
COMMUNITY
Start: 2023-02-11

## 2023-05-19 RX ORDER — TRAMADOL HYDROCHLORIDE 50 MG/1
TABLET ORAL
COMMUNITY
Start: 2023-05-02 | End: 2023-06-01

## 2023-08-13 RX ORDER — AMLODIPINE BESYLATE 5 MG/1
TABLET ORAL
Qty: 90 TABLET | Refills: 1 | Status: SHIPPED | OUTPATIENT
Start: 2023-08-13

## 2023-08-27 DIAGNOSIS — M54.16 RADICULOPATHY, LUMBAR REGION: Primary | ICD-10-CM

## 2023-08-28 RX ORDER — TRAMADOL HYDROCHLORIDE 50 MG/1
TABLET ORAL
Qty: 180 TABLET | Refills: 0 | OUTPATIENT
Start: 2023-08-28 | End: 2023-10-27

## 2023-08-28 NOTE — TELEPHONE ENCOUNTER
T/C to patient regarding need for an annual appt in regards to medication refill. No answer and LVM.

## 2023-08-28 NOTE — TELEPHONE ENCOUNTER
Patient is last seen June 2022 and have heard nothing about her since that time. Is she still taking tramadol? She is overdue for follow-up. Can refill 30-day prescription once appointment is made if needed.

## 2023-08-30 ENCOUNTER — TELEPHONE (OUTPATIENT)
Age: 78
End: 2023-08-30

## 2023-08-30 NOTE — TELEPHONE ENCOUNTER
----- Message from [address] [place], [place] [address]Mark Mcintyre sent at 8/30/2023 11:44 AM EDT -----  Regarding: Appointment  Contact: [phone]  Dear Kary Truong,    Thank you for your call. I apologize for not having made an appointment with Dr. Lizbet Madsen sooner. I had two recent appointments with  [facility] and wanted to get a couple of other things done that Dr. Lizbet Madsen had asked me to do first.  He is such a busy man I guess I hate to bother him. I also could not get on my chart because it had changed. You asked me to get an appointment within a month so I called but they didn't have anything until November so I am waiting to hear back. I wanted to let you know a short appointment for tramadol refill might be easier to get and then the yearly physical maybe when it is more convenient for you folks as there is no rush. What actions would you like me to take?   Kellie Noes

## 2023-08-30 NOTE — TELEPHONE ENCOUNTER
Spoke with patient and assisted with scheduling her Medicare Annual Wellness visit for 9/1/23 at 0800 AM. She will come fasting for any labs ordered. Grateful for the call.

## 2023-08-31 PROBLEM — K86.2 PANCREATIC CYST: Status: ACTIVE | Noted: 2022-07-01

## 2023-09-01 ENCOUNTER — OFFICE VISIT (OUTPATIENT)
Age: 78
End: 2023-09-01
Payer: MEDICARE

## 2023-09-01 VITALS
SYSTOLIC BLOOD PRESSURE: 137 MMHG | TEMPERATURE: 98.2 F | DIASTOLIC BLOOD PRESSURE: 89 MMHG | HEART RATE: 80 BPM | BODY MASS INDEX: 31.57 KG/M2 | HEIGHT: 61 IN | WEIGHT: 167.2 LBS | RESPIRATION RATE: 18 BRPM | OXYGEN SATURATION: 94 %

## 2023-09-01 DIAGNOSIS — N20.0 NEPHROLITHIASIS: ICD-10-CM

## 2023-09-01 DIAGNOSIS — K86.2 PANCREATIC CYST: ICD-10-CM

## 2023-09-01 DIAGNOSIS — Z51.81 MEDICATION MONITORING ENCOUNTER: ICD-10-CM

## 2023-09-01 DIAGNOSIS — G89.29 CHRONIC MIDLINE LOW BACK PAIN WITH SCIATICA, SCIATICA LATERALITY UNSPECIFIED: ICD-10-CM

## 2023-09-01 DIAGNOSIS — R20.2 TINGLING OF UPPER EXTREMITY: ICD-10-CM

## 2023-09-01 DIAGNOSIS — E04.2 MULTIPLE THYROID NODULES: ICD-10-CM

## 2023-09-01 DIAGNOSIS — M54.40 CHRONIC MIDLINE LOW BACK PAIN WITH SCIATICA, SCIATICA LATERALITY UNSPECIFIED: ICD-10-CM

## 2023-09-01 DIAGNOSIS — Z00.00 MEDICARE ANNUAL WELLNESS VISIT, SUBSEQUENT: Primary | ICD-10-CM

## 2023-09-01 DIAGNOSIS — E03.9 ACQUIRED HYPOTHYROIDISM: ICD-10-CM

## 2023-09-01 DIAGNOSIS — Z78.0 ASYMPTOMATIC MENOPAUSAL STATE: ICD-10-CM

## 2023-09-01 DIAGNOSIS — I10 BENIGN ESSENTIAL HTN: ICD-10-CM

## 2023-09-01 DIAGNOSIS — R32 URINARY INCONTINENCE, UNSPECIFIED TYPE: ICD-10-CM

## 2023-09-01 LAB
ALBUMIN SERPL-MCNC: 4 G/DL (ref 3.5–5)
ALBUMIN/GLOB SERPL: 1.3 (ref 1.1–2.2)
ALP SERPL-CCNC: 79 U/L (ref 45–117)
ALT SERPL-CCNC: 44 U/L (ref 12–78)
AMPHET UR QL SCN: NEGATIVE
ANION GAP SERPL CALC-SCNC: 2 MMOL/L (ref 5–15)
AST SERPL-CCNC: 27 U/L (ref 15–37)
BARBITURATES UR QL SCN: NEGATIVE
BENZODIAZ UR QL: NEGATIVE
BILIRUB SERPL-MCNC: 0.6 MG/DL (ref 0.2–1)
BUN SERPL-MCNC: 18 MG/DL (ref 6–20)
BUN/CREAT SERPL: 25 (ref 12–20)
CALCIUM SERPL-MCNC: 9.3 MG/DL (ref 8.5–10.1)
CANNABINOIDS UR QL SCN: NEGATIVE
CHLORIDE SERPL-SCNC: 106 MMOL/L (ref 97–108)
CHOLEST SERPL-MCNC: 207 MG/DL
CO2 SERPL-SCNC: 30 MMOL/L (ref 21–32)
COCAINE UR QL SCN: NEGATIVE
COMMENT:: NORMAL
CREAT SERPL-MCNC: 0.73 MG/DL (ref 0.55–1.02)
ERYTHROCYTE [DISTWIDTH] IN BLOOD BY AUTOMATED COUNT: 13.4 % (ref 11.5–14.5)
GLOBULIN SER CALC-MCNC: 3.1 G/DL (ref 2–4)
GLUCOSE SERPL-MCNC: 97 MG/DL (ref 65–100)
HCT VFR BLD AUTO: 42.4 % (ref 35–47)
HDLC SERPL-MCNC: 58 MG/DL
HDLC SERPL: 3.6 (ref 0–5)
HGB BLD-MCNC: 13.7 G/DL (ref 11.5–16)
LDLC SERPL CALC-MCNC: 130.4 MG/DL (ref 0–100)
Lab: NORMAL
MCH RBC QN AUTO: 28.4 PG (ref 26–34)
MCHC RBC AUTO-ENTMCNC: 32.3 G/DL (ref 30–36.5)
MCV RBC AUTO: 88 FL (ref 80–99)
METHADONE UR QL: NEGATIVE
NRBC # BLD: 0 K/UL (ref 0–0.01)
NRBC BLD-RTO: 0 PER 100 WBC
OPIATES UR QL: NEGATIVE
PCP UR QL: NEGATIVE
PLATELET # BLD AUTO: 298 K/UL (ref 150–400)
PMV BLD AUTO: 10 FL (ref 8.9–12.9)
POTASSIUM SERPL-SCNC: 4.3 MMOL/L (ref 3.5–5.1)
PROT SERPL-MCNC: 7.1 G/DL (ref 6.4–8.2)
RBC # BLD AUTO: 4.82 M/UL (ref 3.8–5.2)
SODIUM SERPL-SCNC: 138 MMOL/L (ref 136–145)
SPECIMEN HOLD: NORMAL
T3FREE SERPL-MCNC: 3.3 PG/ML (ref 2.2–4)
T4 FREE SERPL-MCNC: 1 NG/DL (ref 0.8–1.5)
TRIGL SERPL-MCNC: 93 MG/DL
TSH SERPL DL<=0.05 MIU/L-ACNC: 1.66 UIU/ML (ref 0.36–3.74)
VLDLC SERPL CALC-MCNC: 18.6 MG/DL
WBC # BLD AUTO: 5.2 K/UL (ref 3.6–11)

## 2023-09-01 PROCEDURE — 99214 OFFICE O/P EST MOD 30 MIN: CPT | Performed by: INTERNAL MEDICINE

## 2023-09-01 RX ORDER — PNEUMOCOCCAL 20-VALENT CONJUGATE VACCINE 2.2; 2.2; 2.2; 2.2; 2.2; 2.2; 2.2; 2.2; 2.2; 2.2; 2.2; 2.2; 2.2; 2.2; 2.2; 2.2; 4.4; 2.2; 2.2; 2.2 UG/.5ML; UG/.5ML; UG/.5ML; UG/.5ML; UG/.5ML; UG/.5ML; UG/.5ML; UG/.5ML; UG/.5ML; UG/.5ML; UG/.5ML; UG/.5ML; UG/.5ML; UG/.5ML; UG/.5ML; UG/.5ML; UG/.5ML; UG/.5ML; UG/.5ML; UG/.5ML
0.5 INJECTION, SUSPENSION INTRAMUSCULAR ONCE
Qty: 0.5 ML | Refills: 0 | Status: SHIPPED | OUTPATIENT
Start: 2023-09-01 | End: 2023-09-01

## 2023-09-01 RX ORDER — GABAPENTIN 300 MG/1
300 CAPSULE ORAL
Qty: 90 CAPSULE | Refills: 0
Start: 2023-09-01 | End: 2023-11-30

## 2023-09-01 RX ORDER — LEVOTHYROXINE SODIUM 0.07 MG/1
TABLET ORAL
Qty: 90 TABLET | Refills: 1
Start: 2023-09-01

## 2023-09-01 RX ORDER — ZOSTER VACCINE RECOMBINANT, ADJUVANTED 50 MCG/0.5
0.5 KIT INTRAMUSCULAR SEE ADMIN INSTRUCTIONS
Qty: 0.5 ML | Refills: 0 | Status: SHIPPED | OUTPATIENT
Start: 2023-09-01 | End: 2024-02-28

## 2023-09-01 RX ORDER — TRAMADOL HYDROCHLORIDE 50 MG/1
100 TABLET ORAL EVERY 8 HOURS PRN
Qty: 180 TABLET | Refills: 2 | Status: SHIPPED | OUTPATIENT
Start: 2023-09-01 | End: 2023-11-30

## 2023-09-01 SDOH — ECONOMIC STABILITY: INCOME INSECURITY: HOW HARD IS IT FOR YOU TO PAY FOR THE VERY BASICS LIKE FOOD, HOUSING, MEDICAL CARE, AND HEATING?: NOT HARD AT ALL

## 2023-09-01 SDOH — ECONOMIC STABILITY: FOOD INSECURITY: WITHIN THE PAST 12 MONTHS, YOU WORRIED THAT YOUR FOOD WOULD RUN OUT BEFORE YOU GOT MONEY TO BUY MORE.: NEVER TRUE

## 2023-09-01 SDOH — ECONOMIC STABILITY: FOOD INSECURITY: WITHIN THE PAST 12 MONTHS, THE FOOD YOU BOUGHT JUST DIDN'T LAST AND YOU DIDN'T HAVE MONEY TO GET MORE.: NEVER TRUE

## 2023-09-01 SDOH — ECONOMIC STABILITY: HOUSING INSECURITY
IN THE LAST 12 MONTHS, WAS THERE A TIME WHEN YOU DID NOT HAVE A STEADY PLACE TO SLEEP OR SLEPT IN A SHELTER (INCLUDING NOW)?: NO

## 2023-09-01 ASSESSMENT — PATIENT HEALTH QUESTIONNAIRE - PHQ9
SUM OF ALL RESPONSES TO PHQ QUESTIONS 1-9: 0
1. LITTLE INTEREST OR PLEASURE IN DOING THINGS: 0
SUM OF ALL RESPONSES TO PHQ QUESTIONS 1-9: 0
SUM OF ALL RESPONSES TO PHQ9 QUESTIONS 1 & 2: 0
SUM OF ALL RESPONSES TO PHQ QUESTIONS 1-9: 0
SUM OF ALL RESPONSES TO PHQ QUESTIONS 1-9: 0
2. FEELING DOWN, DEPRESSED OR HOPELESS: 0

## 2023-09-01 ASSESSMENT — LIFESTYLE VARIABLES
HOW OFTEN DO YOU HAVE A DRINK CONTAINING ALCOHOL: NEVER
HOW MANY STANDARD DRINKS CONTAINING ALCOHOL DO YOU HAVE ON A TYPICAL DAY: PATIENT DOES NOT DRINK

## 2023-09-01 NOTE — PROGRESS NOTES
Medicare Annual Wellness Visit    Radha Lgauna is here for Medicare AWV and Lab Collection (Fasting )    Assessment & Plan   Medicare annual wellness visit, subsequent  Recommend multiple immunizations. She has been reluctant. Recommend COVID-19 and influenza vaccine this fall.  -     Tdap (ADACEL) 5-2-15.5 LF-MCG/0.5 injection; Inject 0.5 mLs into the muscle once for 1 dose, Disp-0.5 mL, R-0Print  -     PREVNAR 20 0.5 ML JACKLYN inj; Inject 0.5 mLs into the muscle once for 1 dose, Disp-0.5 mL, R-0, DAWPrint  -     SHINGRIX 50 MCG/0.5ML SUSR injection; Inject 0.5 mLs into the muscle See Admin Instructions 1 dose now and repeat dose in 2-6 months, Disp-0.5 mL, R-0, DAWPrint  Benign essential HTN  Blood pressure is reasonably controlled on current medical therapy with amlodipine 5 mg daily. We will continue.  -     CBC; Future  -     Lipid Panel; Future  -     Comprehensive Metabolic Panel; Future  Acquired hypothyroidism  Unclear status on levothyroxine and Cytomel. She is not seeing endocrinology and request that I check her labs today.  -     TSH; Future  -     T4, Free; Future  -     T3, Free; Future  Multiple thyroid nodules  Says she is no longer seeing endocrinology. Could consider repeat thyroid ultrasound. Pancreatic cyst  Noted to have small pancreatic cysts 1 year ago August 2022 on MRI. IPMN? Radiology recommended a one-time follow-up which I will order now. Asymptomatic.  -     MRI ABDOMEN W WO CONTRAST MRCP; Future  Asymptomatic menopausal state  -     DEXA BONE DENSITY AXIAL SKELETON; Future  Chronic midline low back pain with sciatica, sciatica laterality unspecified  Severe scoliosis  Spondylolisthesis  Chronic lower back pain. Saw neurosurgery in the past.  Also saw Dr. Felice Aldrich. She is relatively stable on current medical therapy with gabapentin and tramadol. Continue both medications. Tolerating well.  profile was accessed online for Pat Reyes and reviewed by me during this encounter.

## 2023-09-02 PROBLEM — G89.29 CHRONIC LOWER BACK PAIN: Status: ACTIVE | Noted: 2023-09-02

## 2023-09-02 PROBLEM — M54.50 CHRONIC LOWER BACK PAIN: Status: ACTIVE | Noted: 2023-09-02

## 2023-10-06 ENCOUNTER — HOSPITAL ENCOUNTER (OUTPATIENT)
Facility: HOSPITAL | Age: 78
End: 2023-10-06
Attending: INTERNAL MEDICINE
Payer: MEDICARE

## 2023-10-06 VITALS — BODY MASS INDEX: 31.18 KG/M2 | WEIGHT: 165 LBS

## 2023-10-06 DIAGNOSIS — Z78.0 ASYMPTOMATIC MENOPAUSAL STATE: ICD-10-CM

## 2023-10-06 DIAGNOSIS — K86.2 PANCREATIC CYST: ICD-10-CM

## 2023-10-06 PROCEDURE — 6360000004 HC RX CONTRAST MEDICATION: Performed by: RADIOLOGY

## 2023-10-06 PROCEDURE — 77080 DXA BONE DENSITY AXIAL: CPT

## 2023-10-06 PROCEDURE — A9579 GAD-BASE MR CONTRAST NOS,1ML: HCPCS | Performed by: RADIOLOGY

## 2023-10-06 PROCEDURE — 74183 MRI ABD W/O CNTR FLWD CNTR: CPT

## 2023-10-06 RX ADMIN — GADOTERIDOL 14 ML: 279.3 INJECTION, SOLUTION INTRAVENOUS at 14:34

## 2023-10-14 PROBLEM — M85.80 OSTEOPENIA: Status: ACTIVE | Noted: 2023-10-01

## 2023-11-02 ENCOUNTER — PATIENT MESSAGE (OUTPATIENT)
Age: 78
End: 2023-11-02

## 2023-11-02 DIAGNOSIS — E03.9 ACQUIRED HYPOTHYROIDISM: Primary | ICD-10-CM

## 2023-11-02 DIAGNOSIS — G89.29 CHRONIC MIDLINE LOW BACK PAIN WITH SCIATICA, SCIATICA LATERALITY UNSPECIFIED: ICD-10-CM

## 2023-11-02 DIAGNOSIS — M54.40 CHRONIC MIDLINE LOW BACK PAIN WITH SCIATICA, SCIATICA LATERALITY UNSPECIFIED: ICD-10-CM

## 2023-11-02 RX ORDER — LEVOTHYROXINE SODIUM 0.07 MG/1
TABLET ORAL
Qty: 90 TABLET | Refills: 1 | Status: SHIPPED | OUTPATIENT
Start: 2023-11-02

## 2023-11-02 NOTE — TELEPHONE ENCOUNTER
From: Maximilian Porter  To: Dr. Arlette Rhodesestic: 11/2/2023 10:00 AM EDT  Subject: Levothyroxine 75 MCG TAB [id number]    Dear Peace Phipps,  I have updated my prescriber ( from Dr. Elizabeth Mooney to Dr. Kina Acevedo) for Levothyroxine with [facility] online pharmacy as there has been some confusion and they cancelled order. Perhaps because it may have been sent to Dr. Elizabeth Mooney who originally had prescribed? I also updated my phone number to [phone] because we do not use the home phone. Malorie recommended I call the prescribing doctor to have you watch out for their request. So I am following through on that request to speed things up. Thank you for your time.  Dong Garzon

## 2023-12-04 DIAGNOSIS — G89.29 CHRONIC MIDLINE LOW BACK PAIN WITH SCIATICA, SCIATICA LATERALITY UNSPECIFIED: ICD-10-CM

## 2023-12-04 DIAGNOSIS — M54.40 CHRONIC MIDLINE LOW BACK PAIN WITH SCIATICA, SCIATICA LATERALITY UNSPECIFIED: ICD-10-CM

## 2023-12-04 RX ORDER — TRAMADOL HYDROCHLORIDE 50 MG/1
100 TABLET ORAL EVERY 8 HOURS PRN
Qty: 180 TABLET | Refills: 2 | Status: SHIPPED | OUTPATIENT
Start: 2023-12-04 | End: 2024-03-03

## 2023-12-26 ENCOUNTER — TELEPHONE (OUTPATIENT)
Age: 78
End: 2023-12-26

## 2023-12-26 RX ORDER — NIRMATRELVIR AND RITONAVIR 300-100 MG
KIT ORAL
Qty: 30 TABLET | Refills: 0 | Status: SHIPPED | OUTPATIENT
Start: 2023-12-26

## 2023-12-26 RX ORDER — NIRMATRELVIR AND RITONAVIR 300-100 MG
KIT ORAL
Qty: 30 TABLET | Refills: 0 | Status: SHIPPED | OUTPATIENT
Start: 2023-12-26 | End: 2023-12-26 | Stop reason: SDUPTHER

## 2023-12-26 NOTE — TELEPHONE ENCOUNTER
Paxlovid sent. Return to clinic for further evaluation if new symptoms develop or if current symptoms worsen or fail to resolve.    Ok to cancel appt if she prefers

## 2023-12-26 NOTE — TELEPHONE ENCOUNTER
Spoke with patient and she reports. She has not tested for COVID. Symptoms started 12/25/23 evening. Current symptoms pain in upper back, green discharge from nose, ears hurt, throat sore, fatigued. She is not recently vaccinated for Flu or COVID. She is asking for an acute visit and scheduled for today at 2:20 PM with NP Domenic Marti, patient advised to wear a mask in to the hospital. Grateful for the call.

## 2023-12-26 NOTE — TELEPHONE ENCOUNTER
Spoke with patient and she reports she home tested positive for COVID. Current symptoms pain in upper back, green discharge from nose, ears hurt, throat sore, fatigued. She is not recently vaccinated for Flu or COVID. She would like to take Paxlovid and asks that it be sent into the [address] [place] [address]. She was advised to self isolate and mask around other s for the next 5-10 days or until she is feeling better. Advised to keep well hydrated, eat well, rest but do not stay in bed. She is advised that if she should become overly fatigued and SOB to go to urgent care for evaluation and treatment. She is advised that Paxlovid could cause diarrhea and risk of rebound COVID. She states understanding and grateful for the call. She would like to cancel her appt with NP if they can call in the [address] [place] [address]. Dr. Christel Fisher notified. Grateful for the call.

## 2023-12-26 NOTE — TELEPHONE ENCOUNTER
The patient  Johanna Tovar is requesting a call back to discuss the patient sore throat and will like to be advise.  PRS offered appointment he decline [phone]

## 2023-12-26 NOTE — TELEPHONE ENCOUNTER
----- Message from Helene Mederos sent at 12/26/2023 11:32 AM EST -----  Subject: Message to Provider    QUESTIONS  Information for Provider? PT has an appointment today 12.26.2023 with Jayshree Churchill for respiratory issues, PTs PCP is Lisa Joyce. The PT took a   home test and it has tested positive for COVID and PT wants to know what   the practice wants her to do. Do you still want her to come in for the   appointment this afternoon at 2:20, please reach out to the PT to discuss   and advise.   ---------------------------------------------------------------------------  --------------  Pepito WAITE  [phone]; OK to leave message on voicemail  ---------------------------------------------------------------------------  --------------  SCRIPT ANSWERS  Relationship to Patient? Spouse/Partner  Representative Name? José Manuel Muhammad   Is the representative on the Communication Release of Information (MOIN)   form in Epic?  Yes

## 2023-12-26 NOTE — TELEPHONE ENCOUNTER
Spoke with patient and updated on script for Paxlovid sent to [address] [place] [address]. She reports they called and do not have it and she asks for it to be re sent to [facility] instead and that she verified that they have it. Re sent as per her request. Advised if he condition worsens or fails to improve to call for an appt. She states understanding and grateful or the call.

## 2023-12-31 RX ORDER — LIOTHYRONINE SODIUM 5 UG/1
5 TABLET ORAL DAILY
Qty: 90 TABLET | Refills: 1 | Status: SHIPPED | OUTPATIENT
Start: 2023-12-31

## 2024-02-06 DIAGNOSIS — G89.29 CHRONIC MIDLINE LOW BACK PAIN WITH SCIATICA, SCIATICA LATERALITY UNSPECIFIED: ICD-10-CM

## 2024-02-06 DIAGNOSIS — M54.40 CHRONIC MIDLINE LOW BACK PAIN WITH SCIATICA, SCIATICA LATERALITY UNSPECIFIED: ICD-10-CM

## 2024-02-06 RX ORDER — GABAPENTIN 300 MG/1
CAPSULE ORAL
Qty: 270 CAPSULE | Refills: 1 | Status: SHIPPED | OUTPATIENT
Start: 2024-02-06 | End: 2024-08-04

## 2024-02-06 NOTE — TELEPHONE ENCOUNTER
Chief Complaint   Patient presents with    Medication Refill       Requested Prescriptions     Pending Prescriptions Disp Refills    gabapentin (NEURONTIN) 300 MG capsule [Pharmacy Med Name: GABAPENTIN 300 MG Capsule] 90 capsule      Sig: TAKE 1 CAPSULE THREE TIMES DAILY       Allergies:  No Known Allergies    Last visit with clinic:  9/1/2023   Next visit with clinic: 3/1/2024     Last visit with this provider: 9/1/2023   Next Visit with this provider: 3/1/2024    Signed by Misbah RENO  02/06/24  8:57 AM

## 2024-03-01 ENCOUNTER — OFFICE VISIT (OUTPATIENT)
Age: 79
End: 2024-03-01
Payer: MEDICARE

## 2024-03-01 VITALS
WEIGHT: 169 LBS | BODY MASS INDEX: 31.91 KG/M2 | TEMPERATURE: 98 F | DIASTOLIC BLOOD PRESSURE: 83 MMHG | SYSTOLIC BLOOD PRESSURE: 131 MMHG | RESPIRATION RATE: 18 BRPM | HEIGHT: 61 IN | HEART RATE: 64 BPM | OXYGEN SATURATION: 98 %

## 2024-03-01 DIAGNOSIS — E03.8 HYPOTHYROIDISM DUE TO HASHIMOTO'S THYROIDITIS: ICD-10-CM

## 2024-03-01 DIAGNOSIS — M54.40 CHRONIC MIDLINE LOW BACK PAIN WITH SCIATICA, SCIATICA LATERALITY UNSPECIFIED: ICD-10-CM

## 2024-03-01 DIAGNOSIS — M41.27 OTHER IDIOPATHIC SCOLIOSIS, LUMBOSACRAL REGION: ICD-10-CM

## 2024-03-01 DIAGNOSIS — E06.3 HYPOTHYROIDISM DUE TO HASHIMOTO'S THYROIDITIS: ICD-10-CM

## 2024-03-01 DIAGNOSIS — G89.29 CHRONIC MIDLINE LOW BACK PAIN WITH SCIATICA, SCIATICA LATERALITY UNSPECIFIED: ICD-10-CM

## 2024-03-01 DIAGNOSIS — I10 BENIGN ESSENTIAL HTN: Primary | ICD-10-CM

## 2024-03-01 DIAGNOSIS — M43.17 SPONDYLOLISTHESIS OF LUMBOSACRAL REGION: ICD-10-CM

## 2024-03-01 PROCEDURE — G8417 CALC BMI ABV UP PARAM F/U: HCPCS | Performed by: INTERNAL MEDICINE

## 2024-03-01 PROCEDURE — 99213 OFFICE O/P EST LOW 20 MIN: CPT | Performed by: INTERNAL MEDICINE

## 2024-03-01 PROCEDURE — G8427 DOCREV CUR MEDS BY ELIG CLIN: HCPCS | Performed by: INTERNAL MEDICINE

## 2024-03-01 PROCEDURE — G8484 FLU IMMUNIZE NO ADMIN: HCPCS | Performed by: INTERNAL MEDICINE

## 2024-03-01 PROCEDURE — 1123F ACP DISCUSS/DSCN MKR DOCD: CPT | Performed by: INTERNAL MEDICINE

## 2024-03-01 PROCEDURE — 3079F DIAST BP 80-89 MM HG: CPT | Performed by: INTERNAL MEDICINE

## 2024-03-01 PROCEDURE — 1090F PRES/ABSN URINE INCON ASSESS: CPT | Performed by: INTERNAL MEDICINE

## 2024-03-01 PROCEDURE — 1036F TOBACCO NON-USER: CPT | Performed by: INTERNAL MEDICINE

## 2024-03-01 PROCEDURE — G8399 PT W/DXA RESULTS DOCUMENT: HCPCS | Performed by: INTERNAL MEDICINE

## 2024-03-01 PROCEDURE — 3075F SYST BP GE 130 - 139MM HG: CPT | Performed by: INTERNAL MEDICINE

## 2024-03-01 RX ORDER — GABAPENTIN 300 MG/1
300 CAPSULE ORAL NIGHTLY
Qty: 90 CAPSULE | Refills: 1
Start: 2024-03-01 | End: 2024-08-28

## 2024-03-01 RX ORDER — ONDANSETRON 4 MG/1
4 TABLET, ORALLY DISINTEGRATING ORAL 3 TIMES DAILY PRN
Qty: 21 TABLET | Refills: 0 | Status: SHIPPED | OUTPATIENT
Start: 2024-03-01

## 2024-03-01 RX ORDER — PREDNISONE 20 MG/1
20 TABLET ORAL 2 TIMES DAILY
Qty: 10 TABLET | Refills: 0 | Status: SHIPPED | OUTPATIENT
Start: 2024-03-01 | End: 2024-03-06

## 2024-03-01 RX ORDER — GABAPENTIN 300 MG/1
300 CAPSULE ORAL DAILY
Qty: 90 CAPSULE | Refills: 1
Start: 2024-03-01 | End: 2024-03-01 | Stop reason: SDUPTHER

## 2024-03-01 ASSESSMENT — PATIENT HEALTH QUESTIONNAIRE - PHQ9
SUM OF ALL RESPONSES TO PHQ QUESTIONS 1-9: 0
SUM OF ALL RESPONSES TO PHQ QUESTIONS 1-9: 0
1. LITTLE INTEREST OR PLEASURE IN DOING THINGS: 0
2. FEELING DOWN, DEPRESSED OR HOPELESS: 0
SUM OF ALL RESPONSES TO PHQ QUESTIONS 1-9: 0
SUM OF ALL RESPONSES TO PHQ QUESTIONS 1-9: 0
SUM OF ALL RESPONSES TO PHQ9 QUESTIONS 1 & 2: 0

## 2024-03-01 NOTE — PROGRESS NOTES
HISTORY OF PRESENT ILLNESS    Chief Complaint   Patient presents with    Follow-up     6 months        Presents for follow-up    Back pain  Acute on chronic pain w scoliosis, radiating to right hip/lateral thigh     Right knee pain periodic collapse  Not radiating to leg  Has chronic RLE and L foot numbness.    Taking gabapentin 300 mg hs  Taking tramadol 7 am, 3 pm and 2 pills at 8 pm now. Taking 5th pills over night prn pain.  Taking ibuprofen or tylenol prn  Her bed is soft and causes more severe pain.  Went to [place] and was more comfortable on firmer bed.    Appt w [facility] Dr. Baez 3/5/24 for follow-up.    Hypothyroidism follow-up  Reports none fatigue  denies heat/cold intolerance, bowel/skin changes or CVS symptoms, losing hair, feeling excessive energy, tremulousness, palpitations. Thyroid medication has been unchanged since last medication check and labs.   Lab Results   Component Value Date    HZQ2GIT 1.66 09/01/2023     Wt Readings from Last 3 Encounters:   03/01/24 76.7 kg (169 lb)   10/06/23 74.8 kg (165 lb)   09/01/23 75.8 kg (167 lb 3.2 oz)     Hypertension  Hypertension ROS: taking medications as instructed, no medication side effects noted, no TIA's, no chest pain on exertion, no dyspnea on exertion, no swelling of ankles     reports that she has never smoked. She has never used smokeless tobacco.    reports no history of alcohol use.   BP Readings from Last 2 Encounters:   03/01/24 131/83   09/01/23 137/89           Review of Systems   All other systems reviewed and are negative, except as noted in HPI    Past Medical and Surgical History   has a past medical history of Benign essential HTN, Chronic lower back pain, EKG abnormalities, Hemangioblastoma of brain (HCC), Hiatal hernia, Hyponatremia, Hypothyroidism, Lumbar radiculopathy, right, Multiple thyroid nodules, Nephrolithiasis, Osteopenia, Pancreatic cyst, Scoliosis, Spondylolisthesis, Tarlov cyst, and Vitamin D deficiency.

## 2024-03-01 NOTE — PROGRESS NOTES
Room:  I have reviewed all needed documentation in preparation for visit. Verified patient by name and date of birth. Rooming procedures conducted per protocol. Reviewed allergies and medications with patient.   Pat Mcintyre is a 78 y.o. female for Follow-up (6 months )       Vitals:    03/01/24 1255   BP: 131/83   Site: Left Upper Arm   Position: Sitting   Cuff Size: Medium Adult   Pulse: 64   Resp: 18   Temp: 98 °F (36.7 °C)   TempSrc: Temporal   SpO2: 98%   Weight: 76.7 kg (169 lb)   Height: 1.549 m (5' 1\")      Pain Score: Zero    No LMP recorded. Patient is postmenopausal.      Health Maintenance Due   Topic Date Due    Respiratory Syncytial Virus (RSV) Pregnant or age 60 yrs+ (1 - 1-dose 60+ series) Never done    Flu vaccine (1) Never done    COVID-19 Vaccine (4 - 2023-24 season) 09/01/2023    Annual Wellness Visit (Medicare Advantage)  01/01/2024        \"Have you been to the ER, urgent care clinic since your last visit?  Hospitalized since your last visit?\"    NO    “Have you seen or consulted any other health care providers outside of [facility] since your last visit?”    NO

## 2024-03-06 ENCOUNTER — PATIENT MESSAGE (OUTPATIENT)
Age: 79
End: 2024-03-06

## 2024-03-06 DIAGNOSIS — M54.40 CHRONIC MIDLINE LOW BACK PAIN WITH SCIATICA, SCIATICA LATERALITY UNSPECIFIED: ICD-10-CM

## 2024-03-06 DIAGNOSIS — G89.29 CHRONIC MIDLINE LOW BACK PAIN WITH SCIATICA, SCIATICA LATERALITY UNSPECIFIED: ICD-10-CM

## 2024-03-06 RX ORDER — TRAMADOL HYDROCHLORIDE 50 MG/1
100 TABLET ORAL EVERY 8 HOURS PRN
Qty: 180 TABLET | Refills: 2 | Status: SHIPPED | OUTPATIENT
Start: 2024-03-06 | End: 2024-06-04

## 2024-03-07 RX ORDER — GABAPENTIN 300 MG/1
300 CAPSULE ORAL NIGHTLY
Qty: 90 CAPSULE | Refills: 1 | Status: SHIPPED | OUTPATIENT
Start: 2024-03-07 | End: 2024-09-03

## 2024-03-07 NOTE — TELEPHONE ENCOUNTER
From: Pat Mcintyre  To: Dr. Contreras Garland  Sent: 3/6/2024 4:21 PM EST  Subject: Regarding Gabapentin Prescription    My chart asked me to contact my pharmacy (mail order) to update a prescription. (regarding the amount to take). I did so this morning to verify the number of pills as 90 rather than 270. Dr. Garland has me down for 1 capsule nightly for 90 days. Max daily amount:300 mg. [facility] has been sending me 270. so the purpose is to confirm 90 or 1/day not 270 and 3/day.

## 2024-03-27 DIAGNOSIS — I10 ESSENTIAL (PRIMARY) HYPERTENSION: Primary | ICD-10-CM

## 2024-03-27 DIAGNOSIS — E03.9 ACQUIRED HYPOTHYROIDISM: ICD-10-CM

## 2024-03-27 RX ORDER — LEVOTHYROXINE SODIUM 0.07 MG/1
TABLET ORAL
Qty: 85 TABLET | Refills: 3 | Status: SHIPPED | OUTPATIENT
Start: 2024-03-27

## 2024-03-27 RX ORDER — AMLODIPINE BESYLATE 5 MG/1
TABLET ORAL
Qty: 90 TABLET | Refills: 3 | Status: SHIPPED | OUTPATIENT
Start: 2024-03-27

## 2024-05-13 DIAGNOSIS — E06.3 HYPOTHYROIDISM DUE TO HASHIMOTO'S THYROIDITIS: Primary | ICD-10-CM

## 2024-05-13 DIAGNOSIS — E03.8 HYPOTHYROIDISM DUE TO HASHIMOTO'S THYROIDITIS: Primary | ICD-10-CM

## 2024-05-13 RX ORDER — LIOTHYRONINE SODIUM 5 UG/1
5 TABLET ORAL DAILY
Qty: 90 TABLET | Refills: 1 | Status: SHIPPED | OUTPATIENT
Start: 2024-05-13

## 2024-05-14 ENCOUNTER — TELEPHONE (OUTPATIENT)
Age: 79
End: 2024-05-14

## 2024-05-14 DIAGNOSIS — M41.27 OTHER IDIOPATHIC SCOLIOSIS, LUMBOSACRAL REGION: ICD-10-CM

## 2024-05-14 DIAGNOSIS — M54.40 CHRONIC MIDLINE LOW BACK PAIN WITH SCIATICA, SCIATICA LATERALITY UNSPECIFIED: Primary | ICD-10-CM

## 2024-05-14 DIAGNOSIS — G89.29 CHRONIC MIDLINE LOW BACK PAIN WITH SCIATICA, SCIATICA LATERALITY UNSPECIFIED: Primary | ICD-10-CM

## 2024-05-14 DIAGNOSIS — M54.16 RADICULOPATHY, LUMBAR REGION: ICD-10-CM

## 2024-05-14 DIAGNOSIS — M43.17 SPONDYLOLISTHESIS OF LUMBOSACRAL REGION: ICD-10-CM

## 2024-05-14 NOTE — TELEPHONE ENCOUNTER
Nima GARCIA (Spouse) called on patients behalf, patient wants to make an appointment with her past surgeon Ryley Guzman at [facility] but the office said she would need a referral before making an appointment, so patient called to see about getting a referral     Nima  [phone] (Mobile)

## 2024-05-14 NOTE — TELEPHONE ENCOUNTER
Referral placed.  She has been seeing her spine spine and pain for ongoing lumbar radicular pains, arthritis and severe scoliosis.

## 2024-05-28 DIAGNOSIS — M54.40 CHRONIC MIDLINE LOW BACK PAIN WITH SCIATICA, SCIATICA LATERALITY UNSPECIFIED: ICD-10-CM

## 2024-05-28 DIAGNOSIS — G89.29 CHRONIC MIDLINE LOW BACK PAIN WITH SCIATICA, SCIATICA LATERALITY UNSPECIFIED: ICD-10-CM

## 2024-05-28 RX ORDER — TRAMADOL HYDROCHLORIDE 50 MG/1
100 TABLET ORAL EVERY 8 HOURS PRN
Qty: 180 TABLET | Refills: 0 | Status: SHIPPED | OUTPATIENT
Start: 2024-05-28 | End: 2024-08-26

## 2024-07-07 DIAGNOSIS — G89.29 CHRONIC MIDLINE LOW BACK PAIN WITH SCIATICA, SCIATICA LATERALITY UNSPECIFIED: ICD-10-CM

## 2024-07-07 DIAGNOSIS — M54.40 CHRONIC MIDLINE LOW BACK PAIN WITH SCIATICA, SCIATICA LATERALITY UNSPECIFIED: ICD-10-CM

## 2024-07-08 RX ORDER — TRAMADOL HYDROCHLORIDE 50 MG/1
100 TABLET ORAL EVERY 8 HOURS PRN
Qty: 180 TABLET | Refills: 0 | Status: SHIPPED | OUTPATIENT
Start: 2024-07-08 | End: 2024-08-07

## 2024-07-22 DIAGNOSIS — M54.40 CHRONIC MIDLINE LOW BACK PAIN WITH SCIATICA, SCIATICA LATERALITY UNSPECIFIED: ICD-10-CM

## 2024-07-22 DIAGNOSIS — G89.29 CHRONIC MIDLINE LOW BACK PAIN WITH SCIATICA, SCIATICA LATERALITY UNSPECIFIED: ICD-10-CM

## 2024-07-22 RX ORDER — TRAMADOL HYDROCHLORIDE 50 MG/1
100 TABLET ORAL EVERY 8 HOURS PRN
Qty: 180 TABLET | Refills: 0 | Status: SHIPPED | OUTPATIENT
Start: 2024-07-22 | End: 2024-08-21

## 2024-07-22 NOTE — TELEPHONE ENCOUNTER
PCP: Contreras Garland MD    Last appt: 3/1/2024   Future Appointments   Date Time Provider Department Center   9/3/2024 10:00 AM Contreras Garland MD [facility]       Requested Prescriptions     Pending Prescriptions Disp Refills    traMADol (ULTRAM) 50 MG tablet 180 tablet 0     Sig: Take 2 tablets by mouth every 8 hours as needed for Pain for up to 30 days. Max Daily Amount: 300 mg       Patient sent a [email or web address] message for refill on tramadol. Patient states that she had her last refill filled on 7/11/2024. Patient states:  \"I received the last of three refills on 7/11/24.  I would life to renew this prescription so it will be ready for future. Rx# [id number].  I have requested that [facility] renew the prescription.\"   Rx in pending for provider review and approval.   Patient does not currently have a pain management contract.     Medication reviewed for interactions and contraindications.   One found:  Tramadol and gabapentin: Severe risk of respiratory depression when used in combination. Both drugs decrease nerve activity in the brain.     Elin \"Alsey\" ARETHA Marino

## 2024-08-02 DIAGNOSIS — G89.29 CHRONIC MIDLINE LOW BACK PAIN WITH SCIATICA, SCIATICA LATERALITY UNSPECIFIED: ICD-10-CM

## 2024-08-02 DIAGNOSIS — M54.40 CHRONIC MIDLINE LOW BACK PAIN WITH SCIATICA, SCIATICA LATERALITY UNSPECIFIED: ICD-10-CM

## 2024-08-05 RX ORDER — TRAMADOL HYDROCHLORIDE 50 MG/1
100 TABLET ORAL EVERY 8 HOURS PRN
Qty: 180 TABLET | Refills: 2 | Status: SHIPPED | OUTPATIENT
Start: 2024-08-05 | End: 2024-11-03

## 2024-08-05 NOTE — TELEPHONE ENCOUNTER
PCP: Contreras Garland MD    Last appt: 3/1/2024   Future Appointments   Date Time Provider Department Center   9/3/2024 10:00 AM Contreras Garland MD [facility]       Requested Prescriptions     Pending Prescriptions Disp Refills    traMADol (ULTRAM) 50 MG tablet [Pharmacy Med Name: TRAMADOL HYDROCHLORIDE 50 MG Tablet] 180 tablet      Sig: TAKE 2 TABLETS EVERY 8 HOURS AS NEEDED FOR PAIN. MAX DAILY AMOUNT  MG (REDUCE DOSES TAKEN AS PAIN BECOMES MANAGEABLE)     Rx in pending for provider review and approval.    Elin \"[place]\" ARETHA Marino

## 2024-09-02 SDOH — HEALTH STABILITY: PHYSICAL HEALTH: ON AVERAGE, HOW MANY MINUTES DO YOU ENGAGE IN EXERCISE AT THIS LEVEL?: 50 MIN

## 2024-09-02 SDOH — HEALTH STABILITY: PHYSICAL HEALTH: ON AVERAGE, HOW MANY DAYS PER WEEK DO YOU ENGAGE IN MODERATE TO STRENUOUS EXERCISE (LIKE A BRISK WALK)?: 4 DAYS

## 2024-09-02 ASSESSMENT — PATIENT HEALTH QUESTIONNAIRE - PHQ9
1. LITTLE INTEREST OR PLEASURE IN DOING THINGS: NOT AT ALL
SUM OF ALL RESPONSES TO PHQ QUESTIONS 1-9: 0
SUM OF ALL RESPONSES TO PHQ9 QUESTIONS 1 & 2: 0
2. FEELING DOWN, DEPRESSED OR HOPELESS: NOT AT ALL

## 2024-09-02 ASSESSMENT — LIFESTYLE VARIABLES
HOW MANY STANDARD DRINKS CONTAINING ALCOHOL DO YOU HAVE ON A TYPICAL DAY: 0
HOW OFTEN DO YOU HAVE SIX OR MORE DRINKS ON ONE OCCASION: 1
HOW OFTEN DO YOU HAVE A DRINK CONTAINING ALCOHOL: 1
HOW MANY STANDARD DRINKS CONTAINING ALCOHOL DO YOU HAVE ON A TYPICAL DAY: PATIENT DOES NOT DRINK
HOW OFTEN DO YOU HAVE A DRINK CONTAINING ALCOHOL: NEVER

## 2024-09-03 ENCOUNTER — HOSPITAL ENCOUNTER (OUTPATIENT)
Facility: HOSPITAL | Age: 79
Discharge: HOME | End: 2024-09-06
Payer: MEDICARE

## 2024-09-03 ENCOUNTER — OFFICE VISIT (OUTPATIENT)
Age: 79
End: 2024-09-03
Payer: MEDICARE

## 2024-09-03 VITALS
TEMPERATURE: 98.2 F | WEIGHT: 172.4 LBS | BODY MASS INDEX: 32.55 KG/M2 | DIASTOLIC BLOOD PRESSURE: 79 MMHG | SYSTOLIC BLOOD PRESSURE: 129 MMHG | OXYGEN SATURATION: 94 % | HEIGHT: 61 IN | HEART RATE: 71 BPM | RESPIRATION RATE: 18 BRPM

## 2024-09-03 DIAGNOSIS — Z00.00 MEDICARE ANNUAL WELLNESS VISIT, SUBSEQUENT: Primary | ICD-10-CM

## 2024-09-03 DIAGNOSIS — I10 BENIGN ESSENTIAL HTN: ICD-10-CM

## 2024-09-03 DIAGNOSIS — G89.29 CHRONIC PAIN OF RIGHT HIP: ICD-10-CM

## 2024-09-03 DIAGNOSIS — Z51.81 MEDICATION MONITORING ENCOUNTER: ICD-10-CM

## 2024-09-03 DIAGNOSIS — R53.83 OTHER FATIGUE: ICD-10-CM

## 2024-09-03 DIAGNOSIS — N20.0 NEPHROLITHIASIS: ICD-10-CM

## 2024-09-03 DIAGNOSIS — E03.9 ACQUIRED HYPOTHYROIDISM: ICD-10-CM

## 2024-09-03 DIAGNOSIS — M25.551 CHRONIC PAIN OF RIGHT HIP: ICD-10-CM

## 2024-09-03 DIAGNOSIS — M54.50 CHRONIC LOW BACK PAIN WITHOUT SCIATICA, UNSPECIFIED BACK PAIN LATERALITY: ICD-10-CM

## 2024-09-03 DIAGNOSIS — M79.641 HAND PAIN, RIGHT: ICD-10-CM

## 2024-09-03 DIAGNOSIS — M54.16 LUMBAR RADICULOPATHY, RIGHT: ICD-10-CM

## 2024-09-03 DIAGNOSIS — G89.29 CHRONIC LOW BACK PAIN WITHOUT SCIATICA, UNSPECIFIED BACK PAIN LATERALITY: ICD-10-CM

## 2024-09-03 LAB
ALBUMIN SERPL-MCNC: 4.2 G/DL (ref 3.5–5)
ALBUMIN/GLOB SERPL: 1.5 (ref 1.1–2.2)
ALP SERPL-CCNC: 72 U/L (ref 45–117)
ALT SERPL-CCNC: 20 U/L (ref 12–78)
AMPHET UR QL SCN: NEGATIVE
ANION GAP SERPL CALC-SCNC: 2 MMOL/L (ref 5–15)
APPEARANCE UR: CLEAR
AST SERPL-CCNC: 19 U/L (ref 15–37)
BACTERIA URNS QL MICRO: NEGATIVE /HPF
BARBITURATES UR QL SCN: NEGATIVE
BENZODIAZ UR QL: NEGATIVE
BILIRUB SERPL-MCNC: 0.5 MG/DL (ref 0.2–1)
BILIRUB UR QL: NEGATIVE
BUN SERPL-MCNC: 20 MG/DL (ref 6–20)
BUN/CREAT SERPL: 30 (ref 12–20)
CALCIUM SERPL-MCNC: 9.6 MG/DL (ref 8.5–10.1)
CANNABINOIDS UR QL SCN: NEGATIVE
CHLORIDE SERPL-SCNC: 104 MMOL/L (ref 97–108)
CHOLEST SERPL-MCNC: 187 MG/DL
CO2 SERPL-SCNC: 31 MMOL/L (ref 21–32)
COCAINE UR QL SCN: NEGATIVE
COLOR UR: ABNORMAL
COMMENT:: NORMAL
CREAT SERPL-MCNC: 0.66 MG/DL (ref 0.55–1.02)
EPITH CASTS URNS QL MICRO: ABNORMAL /LPF
ERYTHROCYTE [DISTWIDTH] IN BLOOD BY AUTOMATED COUNT: 13.2 % (ref 11.5–14.5)
GLOBULIN SER CALC-MCNC: 2.8 G/DL (ref 2–4)
GLUCOSE SERPL-MCNC: 86 MG/DL (ref 65–100)
GLUCOSE UR STRIP.AUTO-MCNC: NEGATIVE MG/DL
HCT VFR BLD AUTO: 42.3 % (ref 35–47)
HDLC SERPL-MCNC: 59 MG/DL
HDLC SERPL: 3.2 (ref 0–5)
HGB BLD-MCNC: 13.5 G/DL (ref 11.5–16)
HGB UR QL STRIP: NEGATIVE
HYALINE CASTS URNS QL MICRO: ABNORMAL /LPF (ref 0–5)
KETONES UR QL STRIP.AUTO: ABNORMAL MG/DL
LDLC SERPL CALC-MCNC: 113.2 MG/DL (ref 0–100)
LEUKOCYTE ESTERASE UR QL STRIP.AUTO: ABNORMAL
Lab: NORMAL
MCH RBC QN AUTO: 28.6 PG (ref 26–34)
MCHC RBC AUTO-ENTMCNC: 31.9 G/DL (ref 30–36.5)
MCV RBC AUTO: 89.6 FL (ref 80–99)
METHADONE UR QL: NEGATIVE
NITRITE UR QL STRIP.AUTO: NEGATIVE
NRBC # BLD: 0 K/UL (ref 0–0.01)
NRBC BLD-RTO: 0 PER 100 WBC
OPIATES UR QL: NEGATIVE
PCP UR QL: NEGATIVE
PH UR STRIP: 5.5 (ref 5–8)
PLATELET # BLD AUTO: 280 K/UL (ref 150–400)
PMV BLD AUTO: 10.6 FL (ref 8.9–12.9)
POTASSIUM SERPL-SCNC: 4.7 MMOL/L (ref 3.5–5.1)
PROT SERPL-MCNC: 7 G/DL (ref 6.4–8.2)
PROT UR STRIP-MCNC: NEGATIVE MG/DL
RBC # BLD AUTO: 4.72 M/UL (ref 3.8–5.2)
RBC #/AREA URNS HPF: ABNORMAL /HPF (ref 0–5)
SODIUM SERPL-SCNC: 137 MMOL/L (ref 136–145)
SP GR UR REFRACTOMETRY: 1.01 (ref 1–1.03)
SPECIMEN HOLD: NORMAL
T3FREE SERPL-MCNC: 3 PG/ML (ref 2.2–4)
T4 FREE SERPL-MCNC: 1.2 NG/DL (ref 0.8–1.5)
TRIGL SERPL-MCNC: 74 MG/DL
TSH SERPL DL<=0.05 MIU/L-ACNC: 1.1 UIU/ML (ref 0.36–3.74)
UROBILINOGEN UR QL STRIP.AUTO: 0.2 EU/DL (ref 0.2–1)
VLDLC SERPL CALC-MCNC: 14.8 MG/DL
WBC # BLD AUTO: 5.2 K/UL (ref 3.6–11)
WBC URNS QL MICRO: ABNORMAL /HPF (ref 0–4)

## 2024-09-03 PROCEDURE — 1123F ACP DISCUSS/DSCN MKR DOCD: CPT | Performed by: INTERNAL MEDICINE

## 2024-09-03 PROCEDURE — 3074F SYST BP LT 130 MM HG: CPT | Performed by: INTERNAL MEDICINE

## 2024-09-03 PROCEDURE — 1036F TOBACCO NON-USER: CPT | Performed by: INTERNAL MEDICINE

## 2024-09-03 PROCEDURE — G8427 DOCREV CUR MEDS BY ELIG CLIN: HCPCS | Performed by: INTERNAL MEDICINE

## 2024-09-03 PROCEDURE — 3078F DIAST BP <80 MM HG: CPT | Performed by: INTERNAL MEDICINE

## 2024-09-03 PROCEDURE — 99215 OFFICE O/P EST HI 40 MIN: CPT | Performed by: INTERNAL MEDICINE

## 2024-09-03 PROCEDURE — 73130 X-RAY EXAM OF HAND: CPT

## 2024-09-03 PROCEDURE — G8399 PT W/DXA RESULTS DOCUMENT: HCPCS | Performed by: INTERNAL MEDICINE

## 2024-09-03 PROCEDURE — 1090F PRES/ABSN URINE INCON ASSESS: CPT | Performed by: INTERNAL MEDICINE

## 2024-09-03 PROCEDURE — G0439 PPPS, SUBSEQ VISIT: HCPCS | Performed by: INTERNAL MEDICINE

## 2024-09-03 PROCEDURE — G8417 CALC BMI ABV UP PARAM F/U: HCPCS | Performed by: INTERNAL MEDICINE

## 2024-09-03 RX ORDER — TRAZODONE HYDROCHLORIDE 50 MG/1
50 TABLET, FILM COATED ORAL NIGHTLY PRN
Qty: 30 TABLET | Refills: 0 | Status: SHIPPED | OUTPATIENT
Start: 2024-09-03

## 2024-09-03 RX ORDER — GLUTATHIONE 100 %
POWDER (GRAM) MISCELLANEOUS DAILY
COMMUNITY

## 2024-09-03 SDOH — ECONOMIC STABILITY: FOOD INSECURITY: WITHIN THE PAST 12 MONTHS, YOU WORRIED THAT YOUR FOOD WOULD RUN OUT BEFORE YOU GOT MONEY TO BUY MORE.: NEVER TRUE

## 2024-09-03 SDOH — ECONOMIC STABILITY: FOOD INSECURITY: WITHIN THE PAST 12 MONTHS, THE FOOD YOU BOUGHT JUST DIDN'T LAST AND YOU DIDN'T HAVE MONEY TO GET MORE.: NEVER TRUE

## 2024-09-03 SDOH — ECONOMIC STABILITY: INCOME INSECURITY: HOW HARD IS IT FOR YOU TO PAY FOR THE VERY BASICS LIKE FOOD, HOUSING, MEDICAL CARE, AND HEATING?: NOT HARD AT ALL

## 2024-09-03 NOTE — PROGRESS NOTES
\"Have you been to the ER, urgent care clinic since your last visit?  Hospitalized since your last visit?\"    NO    “Have you seen or consulted any other health care providers outside of [facility] since your last visit?” 3 weeks ago - Dr. De La Torre. Urology in August.               Click Here for Release of Records Request

## 2024-09-03 NOTE — PATIENT INSTRUCTIONS
and having meals with you, even if they may be eating something different.  Find what works best for you. If you do not have time or do not like to cook, a program that offers meal replacement bars or shakes may be better for you. Or if you like to prepare meals, finding a plan that includes daily menus and recipes may be best.  Ask your doctor about other health professionals who can help you achieve your weight loss goals.  A dietitian can help you make healthy changes in your diet.  An exercise specialist or  can help you develop a safe and effective exercise program.  A counselor or psychiatrist can help you cope with issues such as depression, anxiety, or family problems that can make it hard to focus on weight loss.  Consider joining a support group for people who are trying to lose weight. Your doctor can suggest groups in your area.  Where can you learn more?  Go to https://[email or web address].net/patientEd and enter U357 to learn more about \"Starting a Weight Loss Plan: Care Instructions.\"  Current as of: September 20, 2023  Content Version: 14.1  © 2006-2024 [employer].   Care instructions adapted under license by [employer]. If you have questions about a medical condition or this instruction, always ask your healthcare professional. [employer] disclaims any warranty or liability for your use of this information.           A Healthy Heart: Care Instructions  Overview     Coronary artery disease, also called heart disease, occurs when a substance called plaque builds up in the vessels that supply oxygen-rich blood to your heart muscle. This can narrow the blood vessels and reduce blood flow. A heart attack happens when blood flow is completely blocked. A high-fat diet, smoking, and other factors increase the risk of heart disease.  Your doctor has found that you have a chance of having heart disease. A heart-healthy lifestyle can help keep your heart healthy and

## 2024-09-03 NOTE — PROGRESS NOTES
Medicare Annual Wellness Visit    Pat Mcintyre is here for Medicare AWV and Lab Collection (Nonfasting. )    Assessment & Plan   Medicare annual wellness visit, subsequent  Recommend seasonal influenza and COVID-19 booster and next 60 days.  She appears not to choose to receive influenza vaccines.    Acquired hypothyroidism  Unclear status.  Reports fatigue.  Feels she is felt better on [place] Thyroid.  I do prefer that she remain on levothyroxine and Cytomel, but would be open to changing if she is absolutely convinced that it helped.  Check labs.  Adjust doses to maximize.  Target TSH between 0.5 and 2.0.  -     T4, Free; Future  -     TSH; Future  -     T3, Free; Future    Lumbar radiculopathy, right  This is overall fairly stable.  Prefers not to see pain management again because her last injection was not very helpful and was very painful.  Keep active.  Work on hip pain as below.    Other fatigue  Nonspecific.  Rule out anemia, thyroid dysfunction.  Improve insomnia, try to limit sedating medication such as gabapentin.    Nephrolithiasis  -     Urinalysis; Future  Asymptomatic.  Following up with urology yearly.  Continue stone diet.    Benign essential HTN  Blood pressure is well-controlled on amlodipine  -     CBC; Future  -     Lipid Panel; Future  -     Comprehensive Metabolic Panel; Future    Chronic pain of right hip  Lateral hip pain.  Unclear pathology.  She wishes to establish care with orthopedics which I think is a great idea.  They will do imaging there.  Cannot rule out possibility of radiation.  Patient from lumbar spine.  -     [facility]FL - Juan Carlos Espinal DO, Orthopedic Surgery (hip), [facility]    Chronic low back pain without sciatica, unspecified back pain laterality  Medication monitoring encounter  -     Urine Drug Screen; Future  Maintained on tramadol 100 mg 3 times daily, prescribed by me.  Stopping gabapentin since is causing fatigue, but we could consider a lower dose.

## 2024-11-07 DIAGNOSIS — M54.50 CHRONIC LOW BACK PAIN, UNSPECIFIED BACK PAIN LATERALITY, UNSPECIFIED WHETHER SCIATICA PRESENT: ICD-10-CM

## 2024-11-07 DIAGNOSIS — M25.551 CHRONIC PAIN OF RIGHT HIP: ICD-10-CM

## 2024-11-07 DIAGNOSIS — M54.16 RADICULOPATHY, LUMBAR REGION: ICD-10-CM

## 2024-11-07 DIAGNOSIS — G89.29 CHRONIC PAIN OF RIGHT HIP: ICD-10-CM

## 2024-11-07 DIAGNOSIS — M43.10 SPONDYLOLISTHESIS, UNSPECIFIED SPINAL REGION: ICD-10-CM

## 2024-11-07 DIAGNOSIS — G89.29 CHRONIC LOW BACK PAIN, UNSPECIFIED BACK PAIN LATERALITY, UNSPECIFIED WHETHER SCIATICA PRESENT: ICD-10-CM

## 2024-11-07 DIAGNOSIS — M54.40 CHRONIC MIDLINE LOW BACK PAIN WITH SCIATICA, SCIATICA LATERALITY UNSPECIFIED: ICD-10-CM

## 2024-11-07 DIAGNOSIS — G89.29 CHRONIC MIDLINE LOW BACK PAIN WITH SCIATICA, SCIATICA LATERALITY UNSPECIFIED: ICD-10-CM

## 2024-11-07 DIAGNOSIS — M79.641 HAND PAIN, RIGHT: ICD-10-CM

## 2024-11-07 DIAGNOSIS — M54.16 LUMBAR RADICULOPATHY, RIGHT: Primary | ICD-10-CM

## 2024-11-07 RX ORDER — TRAMADOL HYDROCHLORIDE 50 MG/1
100 TABLET ORAL EVERY 6 HOURS PRN
Qty: 180 TABLET | Refills: 2 | Status: SHIPPED | OUTPATIENT
Start: 2024-11-07 | End: 2025-02-05

## 2024-11-15 DIAGNOSIS — E06.3 HYPOTHYROIDISM DUE TO HASHIMOTO'S THYROIDITIS: ICD-10-CM

## 2024-11-15 RX ORDER — LIOTHYRONINE SODIUM 5 UG/1
5 TABLET ORAL DAILY
Qty: 90 TABLET | Refills: 0 | Status: SHIPPED | OUTPATIENT
Start: 2024-11-15

## 2024-12-29 ENCOUNTER — TELEPHONE (OUTPATIENT)
Facility: CLINIC | Age: 79
End: 2024-12-29

## 2025-01-05 DIAGNOSIS — E06.3 HYPOTHYROIDISM DUE TO HASHIMOTO'S THYROIDITIS: ICD-10-CM

## 2025-01-06 ENCOUNTER — TELEPHONE (OUTPATIENT)
Facility: CLINIC | Age: 80
End: 2025-01-06

## 2025-01-06 RX ORDER — LIOTHYRONINE SODIUM 5 UG/1
5 TABLET ORAL DAILY
Qty: 90 TABLET | Refills: 0 | OUTPATIENT
Start: 2025-01-06

## 2025-01-14 ENCOUNTER — TELEPHONE (OUTPATIENT)
Facility: CLINIC | Age: 80
End: 2025-01-14

## 2025-01-14 NOTE — TELEPHONE ENCOUNTER
----- Message from Manuel MOHAN sent at 1/13/2025  4:30 PM EST -----  Regarding: [facility] Appointment Request  [facility] Appointment Request    Patient needs appointment for [facility] Appointment Type: Hospital Follow Up.    Patient Requested Dates(s):as soon as possible  Patient Requested Time:afternoon  Provider Name:Contreras Garland MD        Reason for Appointment Request:patient need for a hospital follow up appointment and patient got stroke  ----------------------------------------------------------------------------------------------------------------------    Relationship to Patient: Self     Call Back Information: OK to leave message on voicemail  Preferred Call Back Number:[phone]

## 2025-01-15 DIAGNOSIS — E03.9 ACQUIRED HYPOTHYROIDISM: ICD-10-CM

## 2025-01-15 DIAGNOSIS — I10 ESSENTIAL (PRIMARY) HYPERTENSION: ICD-10-CM

## 2025-01-15 RX ORDER — AMLODIPINE BESYLATE 5 MG/1
TABLET ORAL
Qty: 90 TABLET | Refills: 3 | Status: SHIPPED | OUTPATIENT
Start: 2025-01-15

## 2025-01-15 RX ORDER — LEVOTHYROXINE SODIUM 75 UG/1
TABLET ORAL
Qty: 85 TABLET | Refills: 3 | Status: SHIPPED | OUTPATIENT
Start: 2025-01-15

## 2025-01-15 NOTE — TELEPHONE ENCOUNTER
PSR attempted to reach out to patient to schedule FELIPE - no answer, left detailed VM for call back

## 2025-01-22 PROBLEM — I63.9 STROKE (HCC): Status: ACTIVE | Noted: 2024-12-28

## 2025-01-30 ENCOUNTER — TELEPHONE (OUTPATIENT)
Facility: CLINIC | Age: 80
End: 2025-01-30

## 2025-01-30 DIAGNOSIS — I69.328 SPEECH OR LANGUAGE DEFICIT, POST-STROKE: Primary | ICD-10-CM

## 2025-01-30 NOTE — TELEPHONE ENCOUNTER
Spoke with Jose/Spouse and updated and provided information for referral to [facility]. He states understanding and will call to schedule her. Also assisted with scheduling patient for a Hospital Follow UP for S/P Stroke. Schedule for 2/5/25 at 11:20 AM. Grateful for the call. Obtained hospital records from [facility]

## 2025-01-30 NOTE — TELEPHONE ENCOUNTER
Loulou called from [facility] and she is requesting for the provider to send a order for Out patient speech therapy to [facility].   Phone: [phone] she stated  that she did not know the faxed number

## 2025-01-30 NOTE — TELEPHONE ENCOUNTER
Dr. Garland notified and referral made to [facility] for Speech Therapy Fax # [phone] for S/P Stroke with deficits.

## 2025-03-19 ENCOUNTER — PATIENT MESSAGE (OUTPATIENT)
Facility: CLINIC | Age: 80
End: 2025-03-19

## 2025-03-19 DIAGNOSIS — M54.16 RADICULOPATHY, LUMBAR REGION: ICD-10-CM

## 2025-03-19 DIAGNOSIS — M54.16 LUMBAR RADICULOPATHY, RIGHT: ICD-10-CM

## 2025-03-19 DIAGNOSIS — M79.641 HAND PAIN, RIGHT: ICD-10-CM

## 2025-03-19 DIAGNOSIS — M54.50 CHRONIC LOW BACK PAIN, UNSPECIFIED BACK PAIN LATERALITY, UNSPECIFIED WHETHER SCIATICA PRESENT: Primary | ICD-10-CM

## 2025-03-19 DIAGNOSIS — M43.17 SPONDYLOLISTHESIS OF LUMBOSACRAL REGION: ICD-10-CM

## 2025-03-19 DIAGNOSIS — G89.29 CHRONIC PAIN OF RIGHT HIP: ICD-10-CM

## 2025-03-19 DIAGNOSIS — M43.10 SPONDYLOLISTHESIS, UNSPECIFIED SPINAL REGION: ICD-10-CM

## 2025-03-19 DIAGNOSIS — G89.29 CHRONIC LOW BACK PAIN WITHOUT SCIATICA, UNSPECIFIED BACK PAIN LATERALITY: ICD-10-CM

## 2025-03-19 DIAGNOSIS — G89.29 CHRONIC MIDLINE LOW BACK PAIN WITH SCIATICA, SCIATICA LATERALITY UNSPECIFIED: ICD-10-CM

## 2025-03-19 DIAGNOSIS — M54.50 CHRONIC LOW BACK PAIN, UNSPECIFIED BACK PAIN LATERALITY, UNSPECIFIED WHETHER SCIATICA PRESENT: ICD-10-CM

## 2025-03-19 DIAGNOSIS — M54.50 CHRONIC LOW BACK PAIN WITHOUT SCIATICA, UNSPECIFIED BACK PAIN LATERALITY: ICD-10-CM

## 2025-03-19 DIAGNOSIS — M25.551 CHRONIC PAIN OF RIGHT HIP: ICD-10-CM

## 2025-03-19 DIAGNOSIS — G89.29 CHRONIC LOW BACK PAIN, UNSPECIFIED BACK PAIN LATERALITY, UNSPECIFIED WHETHER SCIATICA PRESENT: ICD-10-CM

## 2025-03-19 DIAGNOSIS — M54.40 CHRONIC MIDLINE LOW BACK PAIN WITH SCIATICA, SCIATICA LATERALITY UNSPECIFIED: ICD-10-CM

## 2025-03-19 DIAGNOSIS — G89.29 CHRONIC LOW BACK PAIN, UNSPECIFIED BACK PAIN LATERALITY, UNSPECIFIED WHETHER SCIATICA PRESENT: Primary | ICD-10-CM

## 2025-03-19 NOTE — TELEPHONE ENCOUNTER
We have not refilled tramadol at [facility] yet.  This is the first time we are receiving a refill request.  It would be much easier to get the medication locally rather than relying on mail order.  Can we transition it to a local pharmacy

## 2025-03-20 DIAGNOSIS — E06.3 HYPOTHYROIDISM DUE TO HASHIMOTO'S THYROIDITIS: ICD-10-CM

## 2025-03-20 RX ORDER — TRAMADOL HYDROCHLORIDE 50 MG/1
100 TABLET ORAL EVERY 8 HOURS PRN
Qty: 180 TABLET | Refills: 0 | Status: SHIPPED | OUTPATIENT
Start: 2025-03-20 | End: 2025-04-19

## 2025-03-20 RX ORDER — LIOTHYRONINE SODIUM 5 UG/1
5 TABLET ORAL DAILY
Qty: 90 TABLET | Refills: 0 | Status: SHIPPED | OUTPATIENT
Start: 2025-03-20

## 2025-03-31 ENCOUNTER — OFFICE VISIT (OUTPATIENT)
Facility: CLINIC | Age: 80
End: 2025-03-31
Payer: MEDICARE

## 2025-03-31 VITALS
TEMPERATURE: 98.2 F | SYSTOLIC BLOOD PRESSURE: 133 MMHG | HEIGHT: 61 IN | DIASTOLIC BLOOD PRESSURE: 84 MMHG | BODY MASS INDEX: 27.34 KG/M2 | WEIGHT: 144.8 LBS | HEART RATE: 90 BPM | OXYGEN SATURATION: 91 %

## 2025-03-31 DIAGNOSIS — I10 BENIGN ESSENTIAL HTN: ICD-10-CM

## 2025-03-31 DIAGNOSIS — I69.328 SPEECH OR LANGUAGE DEFICIT, POST-STROKE: Primary | ICD-10-CM

## 2025-03-31 DIAGNOSIS — E03.9 ACQUIRED HYPOTHYROIDISM: ICD-10-CM

## 2025-03-31 PROCEDURE — 1126F AMNT PAIN NOTED NONE PRSNT: CPT | Performed by: INTERNAL MEDICINE

## 2025-03-31 PROCEDURE — G8427 DOCREV CUR MEDS BY ELIG CLIN: HCPCS | Performed by: INTERNAL MEDICINE

## 2025-03-31 PROCEDURE — 1160F RVW MEDS BY RX/DR IN RCRD: CPT | Performed by: INTERNAL MEDICINE

## 2025-03-31 PROCEDURE — G8399 PT W/DXA RESULTS DOCUMENT: HCPCS | Performed by: INTERNAL MEDICINE

## 2025-03-31 PROCEDURE — 1036F TOBACCO NON-USER: CPT | Performed by: INTERNAL MEDICINE

## 2025-03-31 PROCEDURE — 3079F DIAST BP 80-89 MM HG: CPT | Performed by: INTERNAL MEDICINE

## 2025-03-31 PROCEDURE — 3075F SYST BP GE 130 - 139MM HG: CPT | Performed by: INTERNAL MEDICINE

## 2025-03-31 PROCEDURE — G8417 CALC BMI ABV UP PARAM F/U: HCPCS | Performed by: INTERNAL MEDICINE

## 2025-03-31 PROCEDURE — 1159F MED LIST DOCD IN RCRD: CPT | Performed by: INTERNAL MEDICINE

## 2025-03-31 PROCEDURE — 1123F ACP DISCUSS/DSCN MKR DOCD: CPT | Performed by: INTERNAL MEDICINE

## 2025-03-31 PROCEDURE — 99214 OFFICE O/P EST MOD 30 MIN: CPT | Performed by: INTERNAL MEDICINE

## 2025-03-31 PROCEDURE — 1090F PRES/ABSN URINE INCON ASSESS: CPT | Performed by: INTERNAL MEDICINE

## 2025-03-31 RX ORDER — ASPIRIN 81 MG/1
81 TABLET, CHEWABLE ORAL DAILY
COMMUNITY

## 2025-03-31 RX ORDER — ATORVASTATIN CALCIUM 40 MG/1
40 TABLET, FILM COATED ORAL DAILY
Qty: 90 TABLET | Refills: 1 | Status: SHIPPED | OUTPATIENT
Start: 2025-03-31

## 2025-03-31 SDOH — ECONOMIC STABILITY: FOOD INSECURITY: WITHIN THE PAST 12 MONTHS, YOU WORRIED THAT YOUR FOOD WOULD RUN OUT BEFORE YOU GOT MONEY TO BUY MORE.: NEVER TRUE

## 2025-03-31 SDOH — ECONOMIC STABILITY: FOOD INSECURITY: WITHIN THE PAST 12 MONTHS, THE FOOD YOU BOUGHT JUST DIDN'T LAST AND YOU DIDN'T HAVE MONEY TO GET MORE.: NEVER TRUE

## 2025-03-31 ASSESSMENT — PATIENT HEALTH QUESTIONNAIRE - PHQ9
1. LITTLE INTEREST OR PLEASURE IN DOING THINGS: NOT AT ALL
2. FEELING DOWN, DEPRESSED OR HOPELESS: NOT AT ALL
SUM OF ALL RESPONSES TO PHQ QUESTIONS 1-9: 0

## 2025-03-31 NOTE — PROGRESS NOTES
Identified pt with two pt identifiers(name and ). Reviewed record in preparation for visit and have obtained necessary documentation. All patient medications has been reviewed.  Chief Complaint   Patient presents with    Other     Motor vehicle application and paperwork for 's License       Health Maintenance Due   Topic    Respiratory Syncytial Virus (RSV) Pregnant or age 60 yrs+ (1 - 1-dose 75+ series)    Flu vaccine (1)    COVID-19 Vaccine ( season)    Annual Wellness Visit (Medicare Advantage)      Health Maintenance Review: Patient reminded of \"due or due soon\" health maintenance. I have asked the patient to contact his/her primary care provider (PCP) for follow-up on his/her health maintenance.    Wt Readings from Last 3 Encounters:   25 65.7 kg (144 lb 12.8 oz)   24 78.2 kg (172 lb 6.4 oz)   24 76.7 kg (169 lb)     Temp Readings from Last 3 Encounters:   25 98.2 °F (36.8 °C)   24 98.2 °F (36.8 °C) (Oral)   24 98 °F (36.7 °C) (Temporal)     BP Readings from Last 3 Encounters:   25 133/84   24 129/79   24 131/83     Pulse Readings from Last 3 Encounters:   25 90   24 71   24 64       1. \"Have you been to the ER, urgent care clinic since your last visit?  Hospitalized since your last visit?\"  [facility]    2. \"Have you seen or consulted any other health care providers outside of the [facility] since your last visit?\"  [facility]      3. For patients aged 45-75: Has the patient had a colonoscopy / FIT/ Cologuard? NA - based on age    If the patient is female:    4. For patients aged 40-74: Has the patient had a mammogram within the past 2 years? NA - based on age or sex    5. For patients aged 21-65: Has the patient had a pap smear? NA - based on age or sex    Patient is accompanied by self I have received verbal consent from Pat Mcintyre to discuss any/all medical

## 2025-04-01 LAB
ALBUMIN SERPL-MCNC: 4.3 G/DL (ref 3.5–5)
ALBUMIN/GLOB SERPL: 1.3 (ref 1.1–2.2)
ALP SERPL-CCNC: 86 U/L (ref 45–117)
ALT SERPL-CCNC: 22 U/L (ref 12–78)
ANION GAP SERPL CALC-SCNC: 6 MMOL/L (ref 2–12)
AST SERPL-CCNC: 15 U/L (ref 15–37)
BILIRUB SERPL-MCNC: 0.5 MG/DL (ref 0.2–1)
BUN SERPL-MCNC: 17 MG/DL (ref 6–20)
BUN/CREAT SERPL: 22 (ref 12–20)
CALCIUM SERPL-MCNC: 10.3 MG/DL (ref 8.5–10.1)
CHLORIDE SERPL-SCNC: 104 MMOL/L (ref 97–108)
CO2 SERPL-SCNC: 28 MMOL/L (ref 21–32)
CREAT SERPL-MCNC: 0.77 MG/DL (ref 0.55–1.02)
GLOBULIN SER CALC-MCNC: 3.3 G/DL (ref 2–4)
GLUCOSE SERPL-MCNC: 107 MG/DL (ref 65–100)
POTASSIUM SERPL-SCNC: 4.4 MMOL/L (ref 3.5–5.1)
PROT SERPL-MCNC: 7.6 G/DL (ref 6.4–8.2)
SODIUM SERPL-SCNC: 138 MMOL/L (ref 136–145)
T3FREE SERPL-MCNC: 3.2 PG/ML (ref 2.2–4)
T4 FREE SERPL-MCNC: 1.4 NG/DL (ref 0.8–1.5)
TSH SERPL DL<=0.05 MIU/L-ACNC: 0.53 UIU/ML (ref 0.36–3.74)

## 2025-04-01 NOTE — PROGRESS NOTES
HISTORY OF PRESENT ILLNESS    Chief Complaint   Patient presents with    Other     Motor vehicle application and paperwork for 's License       Presents for follow-up    History of Present Illness  The patient is a 80-year-old woman who presents today for a follow-up of a stroke, which occurred on 12/29/2024. She was admitted to [facility] from 12/29/2024 until 01/05/2025 for aphasia. In the emergency room, her labs were unremarkable, and her head CT showed an acute/subacute CVA of the left MCA. A CTA of the head and neck showed no obstruction. Neurology recommended aspirin and Plavix for 21 days, followed by aspirin alone, continuation of statin therapy, and a cardiology evaluation without any evidence of arrhythmia. A repeat CT showed an evolving infarct but no hemorrhage, mass effect, or midline shift. An echocardiogram was normal with no PFO. She was found to have hypokalemia, which was treated. The use of an implantable loop recorder was considered to rule out atrial fibrillation as a cause.   Her hypothyroidism was controlled.   She was referred to home physical therapy and occupational therapy and has been attending speech therapy at [facility].   The goal of today's visit is to complete DMV forms regarding her current ability to drive. She is accompanied by her .    Unfortunately, she continues to have significant word finding difficulties with occasional word salad and nonsensical substitution of words.  She is somewhat pressured in speech and tangential.    Active participation in speech therapy at [facility] has been reported twice weekly, with her  providing transportation. No difficulties with eating or drinking are reported. She has abstained from driving since her stroke but expresses a desire to resume this activity. A driving simulator test is scheduled for [facility]..   Her  notes significant progress in her condition, including improved

## 2025-04-06 ENCOUNTER — RESULTS FOLLOW-UP (OUTPATIENT)
Facility: CLINIC | Age: 80
End: 2025-04-06

## 2025-04-10 ENCOUNTER — TELEPHONE (OUTPATIENT)
Facility: CLINIC | Age: 80
End: 2025-04-10

## 2025-04-10 NOTE — TELEPHONE ENCOUNTER
I will complete the DMV form that she is unable to drive for 90 days from her last eval on 4/6/25, so at least until 7/5/25.      Referral for OT for ongoing ST and drivers rehab 1x per week for 6 weeks written, [facility]

## 2025-04-10 NOTE — TELEPHONE ENCOUNTER
Spoke with Patient and Nima /Spouse (HIPAA VERIFIED) in clinic regarding a request for a DMV Form to clear patient for Driving. Advised that Dr. Garland had sent them a message regarding this issue.  Reviewed Dr. Garland's message with them. Advised evaluation confirms that you are unsafe to operate a motor vehicle at this time. Advised that he recommends patient to have 3 more months of therapy and then to retest her driving. Nima reports that they have already received a letter from [facility] indicating she is not to drive. Nima asks that Dr. Garland go ahead and send the forms in to [facility]. He asks if Dr. Garland will order 3 more months of therapy OT/ST at [facility] on [facility]. Patient voiced concerns that they have no way of getting around to get groceries,  meds and doctors appts for both her and her . Reassurance given and advised we can look into resources for them. They state understanding. Patients Aphasia is noted to be significant and can not communicate effectively. She was upset which may have exacerbated her inability to speak. Dr. Garland notified.

## 2025-04-11 ENCOUNTER — PATIENT MESSAGE (OUTPATIENT)
Facility: CLINIC | Age: 80
End: 2025-04-11
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
## 2025-04-26 ENCOUNTER — PATIENT MESSAGE (OUTPATIENT)
Facility: CLINIC | Age: 80
End: 2025-04-26

## 2025-04-26 DIAGNOSIS — G89.29 CHRONIC LOW BACK PAIN, UNSPECIFIED BACK PAIN LATERALITY, UNSPECIFIED WHETHER SCIATICA PRESENT: ICD-10-CM

## 2025-04-26 DIAGNOSIS — M79.641 HAND PAIN, RIGHT: ICD-10-CM

## 2025-04-26 DIAGNOSIS — M43.10 SPONDYLOLISTHESIS, UNSPECIFIED SPINAL REGION: ICD-10-CM

## 2025-04-26 DIAGNOSIS — M54.50 CHRONIC LOW BACK PAIN, UNSPECIFIED BACK PAIN LATERALITY, UNSPECIFIED WHETHER SCIATICA PRESENT: ICD-10-CM

## 2025-04-26 DIAGNOSIS — M54.50 CHRONIC LOW BACK PAIN WITHOUT SCIATICA, UNSPECIFIED BACK PAIN LATERALITY: ICD-10-CM

## 2025-04-26 DIAGNOSIS — M54.16 LUMBAR RADICULOPATHY, RIGHT: Primary | ICD-10-CM

## 2025-04-26 DIAGNOSIS — G89.29 CHRONIC PAIN OF RIGHT HIP: ICD-10-CM

## 2025-04-26 DIAGNOSIS — G89.29 CHRONIC MIDLINE LOW BACK PAIN WITH SCIATICA, SCIATICA LATERALITY UNSPECIFIED: ICD-10-CM

## 2025-04-26 DIAGNOSIS — M54.40 CHRONIC MIDLINE LOW BACK PAIN WITH SCIATICA, SCIATICA LATERALITY UNSPECIFIED: ICD-10-CM

## 2025-04-26 DIAGNOSIS — G89.29 CHRONIC LOW BACK PAIN WITHOUT SCIATICA, UNSPECIFIED BACK PAIN LATERALITY: ICD-10-CM

## 2025-04-26 DIAGNOSIS — M54.16 RADICULOPATHY, LUMBAR REGION: ICD-10-CM

## 2025-04-26 DIAGNOSIS — M25.551 CHRONIC PAIN OF RIGHT HIP: ICD-10-CM

## 2025-04-28 RX ORDER — TRAMADOL HYDROCHLORIDE 50 MG/1
50 TABLET ORAL EVERY 8 HOURS PRN
Qty: 180 TABLET | Refills: 0 | Status: CANCELLED | OUTPATIENT
Start: 2025-04-28 | End: 2025-06-27

## 2025-04-28 RX ORDER — TRAMADOL HYDROCHLORIDE 50 MG/1
100 TABLET ORAL EVERY 8 HOURS PRN
Qty: 180 TABLET | Refills: 2 | Status: SHIPPED | OUTPATIENT
Start: 2025-04-28 | End: 2025-07-27

## 2025-06-01 DIAGNOSIS — G89.29 CHRONIC MIDLINE LOW BACK PAIN WITH SCIATICA, SCIATICA LATERALITY UNSPECIFIED: ICD-10-CM

## 2025-06-01 DIAGNOSIS — M54.40 CHRONIC MIDLINE LOW BACK PAIN WITH SCIATICA, SCIATICA LATERALITY UNSPECIFIED: ICD-10-CM

## 2025-06-01 DIAGNOSIS — I69.328 SPEECH OR LANGUAGE DEFICIT, POST-STROKE: ICD-10-CM

## 2025-06-01 DIAGNOSIS — E06.3 HYPOTHYROIDISM DUE TO HASHIMOTO'S THYROIDITIS: ICD-10-CM

## 2025-06-02 RX ORDER — ATORVASTATIN CALCIUM 40 MG/1
40 TABLET, FILM COATED ORAL DAILY
Qty: 90 TABLET | Refills: 1 | Status: SHIPPED | OUTPATIENT
Start: 2025-06-02

## 2025-06-02 RX ORDER — TRAMADOL HYDROCHLORIDE 50 MG/1
100 TABLET ORAL EVERY 8 HOURS PRN
Qty: 180 TABLET | Refills: 2 | Status: SHIPPED | OUTPATIENT
Start: 2025-06-02 | End: 2025-08-31

## 2025-06-02 RX ORDER — LIOTHYRONINE SODIUM 5 UG/1
5 TABLET ORAL DAILY
Qty: 90 TABLET | Refills: 0 | Status: SHIPPED | OUTPATIENT
Start: 2025-06-02

## 2025-07-01 DIAGNOSIS — M54.40 CHRONIC MIDLINE LOW BACK PAIN WITH SCIATICA, SCIATICA LATERALITY UNSPECIFIED: ICD-10-CM

## 2025-07-01 DIAGNOSIS — G89.29 CHRONIC MIDLINE LOW BACK PAIN WITH SCIATICA, SCIATICA LATERALITY UNSPECIFIED: ICD-10-CM

## 2025-07-02 RX ORDER — TRAMADOL HYDROCHLORIDE 50 MG/1
100 TABLET ORAL EVERY 8 HOURS PRN
Qty: 180 TABLET | Refills: 2 | Status: SHIPPED | OUTPATIENT
Start: 2025-07-02 | End: 2025-09-30

## 2025-08-03 DIAGNOSIS — G89.29 CHRONIC MIDLINE LOW BACK PAIN WITH SCIATICA, SCIATICA LATERALITY UNSPECIFIED: ICD-10-CM

## 2025-08-03 DIAGNOSIS — M54.40 CHRONIC MIDLINE LOW BACK PAIN WITH SCIATICA, SCIATICA LATERALITY UNSPECIFIED: ICD-10-CM

## 2025-08-04 RX ORDER — TRAMADOL HYDROCHLORIDE 50 MG/1
100 TABLET ORAL EVERY 8 HOURS PRN
Qty: 180 TABLET | Refills: 2 | Status: SHIPPED | OUTPATIENT
Start: 2025-08-04 | End: 2025-11-02

## 2025-08-28 DIAGNOSIS — E06.3 HYPOTHYROIDISM DUE TO HASHIMOTO'S THYROIDITIS: ICD-10-CM

## 2025-08-28 RX ORDER — LIOTHYRONINE SODIUM 5 UG/1
5 TABLET ORAL DAILY
Qty: 90 TABLET | Refills: 0 | Status: SHIPPED | OUTPATIENT
Start: 2025-08-28

## 2025-09-01 DIAGNOSIS — M54.40 CHRONIC MIDLINE LOW BACK PAIN WITH SCIATICA, SCIATICA LATERALITY UNSPECIFIED: ICD-10-CM

## 2025-09-01 DIAGNOSIS — G89.29 CHRONIC MIDLINE LOW BACK PAIN WITH SCIATICA, SCIATICA LATERALITY UNSPECIFIED: ICD-10-CM

## 2025-09-02 RX ORDER — TRAMADOL HYDROCHLORIDE 50 MG/1
100 TABLET ORAL EVERY 8 HOURS PRN
Qty: 180 TABLET | Refills: 2 | Status: SHIPPED | OUTPATIENT
Start: 2025-09-02 | End: 2025-12-01